# Patient Record
Sex: FEMALE | Race: BLACK OR AFRICAN AMERICAN | Employment: OTHER | URBAN - METROPOLITAN AREA
[De-identification: names, ages, dates, MRNs, and addresses within clinical notes are randomized per-mention and may not be internally consistent; named-entity substitution may affect disease eponyms.]

---

## 2017-05-12 ENCOUNTER — TELEPHONE (OUTPATIENT)
Dept: PHARMACY | Facility: CLINIC | Age: 82
End: 2017-05-12

## 2017-05-12 DIAGNOSIS — I10 RESISTANT HYPERTENSION: Primary | ICD-10-CM

## 2018-06-15 ENCOUNTER — APPOINTMENT (OUTPATIENT)
Dept: GENERAL RADIOLOGY | Age: 83
End: 2018-06-15
Payer: MEDICARE

## 2018-06-15 ENCOUNTER — HOSPITAL ENCOUNTER (EMERGENCY)
Age: 83
Discharge: HOME | End: 2018-06-15
Attending: EMERGENCY MEDICINE
Payer: MEDICARE

## 2018-06-15 VITALS
SYSTOLIC BLOOD PRESSURE: 142 MMHG | TEMPERATURE: 97.7 F | HEART RATE: 86 BPM | OXYGEN SATURATION: 99 % | BODY MASS INDEX: 27.25 KG/M2 | RESPIRATION RATE: 17 BRPM | WEIGHT: 174 LBS | DIASTOLIC BLOOD PRESSURE: 92 MMHG

## 2018-06-15 DIAGNOSIS — I10 ESSENTIAL HYPERTENSION: ICD-10-CM

## 2018-06-15 DIAGNOSIS — K59.00 CONSTIPATION, UNSPECIFIED CONSTIPATION TYPE: Primary | ICD-10-CM

## 2018-06-15 LAB
ALBUMIN SERPL-MCNC: 4.4 G/DL (ref 3.5–5.2)
ALP BLD-CCNC: 102 U/L (ref 35–104)
ALT SERPL-CCNC: 18 U/L (ref 0–32)
ANION GAP SERPL CALCULATED.3IONS-SCNC: 13 MMOL/L (ref 7–16)
AST SERPL-CCNC: 22 U/L (ref 0–31)
BASOPHILS ABSOLUTE: 0.04 E9/L (ref 0–0.2)
BASOPHILS RELATIVE PERCENT: 0.7 % (ref 0–2)
BILIRUB SERPL-MCNC: 0.5 MG/DL (ref 0–1.2)
BUN BLDV-MCNC: 13 MG/DL (ref 8–23)
CALCIUM SERPL-MCNC: 9.7 MG/DL (ref 8.6–10.2)
CHLORIDE BLD-SCNC: 103 MMOL/L (ref 98–107)
CO2: 25 MMOL/L (ref 22–29)
CREAT SERPL-MCNC: 1.2 MG/DL (ref 0.5–1)
EOSINOPHILS ABSOLUTE: 0.15 E9/L (ref 0.05–0.5)
EOSINOPHILS RELATIVE PERCENT: 2.8 % (ref 0–6)
GFR AFRICAN AMERICAN: 52
GFR NON-AFRICAN AMERICAN: 52 ML/MIN/1.73
GLUCOSE BLD-MCNC: 159 MG/DL (ref 74–109)
HCT VFR BLD CALC: 40.8 % (ref 34–48)
HEMOGLOBIN: 13.9 G/DL (ref 11.5–15.5)
IMMATURE GRANULOCYTES #: 0.01 E9/L
IMMATURE GRANULOCYTES %: 0.2 % (ref 0–5)
LACTIC ACID, SEPSIS: 1.1 MMOL/L (ref 0.5–1.9)
LYMPHOCYTES ABSOLUTE: 2.56 E9/L (ref 1.5–4)
LYMPHOCYTES RELATIVE PERCENT: 47.5 % (ref 20–42)
MCH RBC QN AUTO: 32.4 PG (ref 26–35)
MCHC RBC AUTO-ENTMCNC: 34.1 % (ref 32–34.5)
MCV RBC AUTO: 95.1 FL (ref 80–99.9)
MONOCYTES ABSOLUTE: 0.59 E9/L (ref 0.1–0.95)
MONOCYTES RELATIVE PERCENT: 10.9 % (ref 2–12)
NEUTROPHILS ABSOLUTE: 2.04 E9/L (ref 1.8–7.3)
NEUTROPHILS RELATIVE PERCENT: 37.9 % (ref 43–80)
PDW BLD-RTO: 13.8 FL (ref 11.5–15)
PLATELET # BLD: 345 E9/L (ref 130–450)
PMV BLD AUTO: 8.8 FL (ref 7–12)
POTASSIUM SERPL-SCNC: 3.8 MMOL/L (ref 3.5–5)
RBC # BLD: 4.29 E12/L (ref 3.5–5.5)
SODIUM BLD-SCNC: 141 MMOL/L (ref 132–146)
TOTAL PROTEIN: 7.4 G/DL (ref 6.4–8.3)
WBC # BLD: 5.4 E9/L (ref 4.5–11.5)

## 2018-06-15 PROCEDURE — 74022 RADEX COMPL AQT ABD SERIES: CPT

## 2018-06-15 PROCEDURE — 80053 COMPREHEN METABOLIC PANEL: CPT

## 2018-06-15 PROCEDURE — 99284 EMERGENCY DEPT VISIT MOD MDM: CPT

## 2018-06-15 PROCEDURE — 83605 ASSAY OF LACTIC ACID: CPT

## 2018-06-15 PROCEDURE — 36415 COLL VENOUS BLD VENIPUNCTURE: CPT

## 2018-06-15 PROCEDURE — 85025 COMPLETE CBC W/AUTO DIFF WBC: CPT

## 2018-06-15 RX ORDER — POLYETHYLENE GLYCOL 3350 17 G/17G
POWDER, FOR SOLUTION ORAL
Qty: 1 BOTTLE | Refills: 0 | Status: SHIPPED | OUTPATIENT
Start: 2018-06-15 | End: 2019-01-09 | Stop reason: SDUPTHER

## 2018-06-15 RX ORDER — MAGNESIUM CARB/ALUMINUM HYDROX 105-160MG
TABLET,CHEWABLE ORAL
Qty: 1 BOTTLE | Refills: 0 | Status: SHIPPED | OUTPATIENT
Start: 2018-06-15 | End: 2018-07-23 | Stop reason: ALTCHOICE

## 2018-06-15 ASSESSMENT — ENCOUNTER SYMPTOMS
ABDOMINAL DISTENTION: 0
BLOOD IN STOOL: 0
SHORTNESS OF BREATH: 0
ABDOMINAL PAIN: 0
VOMITING: 0
COUGH: 0
NAUSEA: 0
COLOR CHANGE: 0
DIARRHEA: 0
CONSTIPATION: 1
SORE THROAT: 0
BACK PAIN: 0

## 2018-07-23 ENCOUNTER — HOSPITAL ENCOUNTER (INPATIENT)
Age: 83
LOS: 4 days | Discharge: HOME | DRG: 683 | End: 2018-07-27
Attending: INTERNAL MEDICINE | Admitting: INTERNAL MEDICINE
Payer: MEDICARE

## 2018-07-23 PROBLEM — R55 SYNCOPE: Status: ACTIVE | Noted: 2018-07-23

## 2018-07-23 PROCEDURE — 2060000000 HC ICU INTERMEDIATE R&B

## 2018-07-23 ASSESSMENT — PAIN SCALES - GENERAL: PAINLEVEL_OUTOF10: 0

## 2018-07-24 LAB
ALBUMIN SERPL-MCNC: 3.8 G/DL (ref 3.5–5.2)
ALP BLD-CCNC: 78 U/L (ref 35–104)
ALT SERPL-CCNC: 21 U/L (ref 0–32)
ANION GAP SERPL CALCULATED.3IONS-SCNC: 13 MMOL/L (ref 7–16)
AST SERPL-CCNC: 26 U/L (ref 0–31)
BASOPHILS ABSOLUTE: 0.04 E9/L (ref 0–0.2)
BASOPHILS RELATIVE PERCENT: 0.6 % (ref 0–2)
BILIRUB SERPL-MCNC: 0.6 MG/DL (ref 0–1.2)
BUN BLDV-MCNC: 30 MG/DL (ref 8–23)
CALCIUM SERPL-MCNC: 9.6 MG/DL (ref 8.6–10.2)
CHLORIDE BLD-SCNC: 104 MMOL/L (ref 98–107)
CHLORIDE URINE RANDOM: 21 MMOL/L
CO2: 24 MMOL/L (ref 22–29)
CREAT SERPL-MCNC: 1.3 MG/DL (ref 0.5–1)
CREATININE URINE: 16 MG/DL (ref 29–226)
EOSINOPHILS ABSOLUTE: 0.15 E9/L (ref 0.05–0.5)
EOSINOPHILS RELATIVE PERCENT: 2.4 % (ref 0–6)
GFR AFRICAN AMERICAN: 47
GFR NON-AFRICAN AMERICAN: 47 ML/MIN/1.73
GLUCOSE BLD-MCNC: 122 MG/DL (ref 74–109)
HCT VFR BLD CALC: 39.1 % (ref 34–48)
HEMOGLOBIN: 13 G/DL (ref 11.5–15.5)
IMMATURE GRANULOCYTES #: 0.01 E9/L
IMMATURE GRANULOCYTES %: 0.2 % (ref 0–5)
LYMPHOCYTES ABSOLUTE: 3.45 E9/L (ref 1.5–4)
LYMPHOCYTES RELATIVE PERCENT: 55.2 % (ref 20–42)
MAGNESIUM: 2.2 MG/DL (ref 1.6–2.6)
MCH RBC QN AUTO: 31.9 PG (ref 26–35)
MCHC RBC AUTO-ENTMCNC: 33.2 % (ref 32–34.5)
MCV RBC AUTO: 95.8 FL (ref 80–99.9)
MONOCYTES ABSOLUTE: 0.72 E9/L (ref 0.1–0.95)
MONOCYTES RELATIVE PERCENT: 11.5 % (ref 2–12)
NEUTROPHILS ABSOLUTE: 1.88 E9/L (ref 1.8–7.3)
NEUTROPHILS RELATIVE PERCENT: 30.1 % (ref 43–80)
OSMOLALITY URINE: 141 MOSM/KG (ref 300–900)
PDW BLD-RTO: 13.6 FL (ref 11.5–15)
PHOSPHORUS: 4 MG/DL (ref 2.5–4.5)
PLATELET # BLD: 225 E9/L (ref 130–450)
PMV BLD AUTO: 8.9 FL (ref 7–12)
POTASSIUM SERPL-SCNC: 4.2 MMOL/L (ref 3.5–5)
PROTEIN PROTEIN: <4 MG/DL (ref 0–12)
PROTEIN/CREAT RATIO: 0.2
PROTEIN/CREAT RATIO: 0.2 (ref 0–0.2)
RBC # BLD: 4.08 E12/L (ref 3.5–5.5)
SODIUM BLD-SCNC: 141 MMOL/L (ref 132–146)
SODIUM URINE: 29 MMOL/L
TOTAL PROTEIN: 6.2 G/DL (ref 6.4–8.3)
TSH SERPL DL<=0.05 MIU/L-ACNC: 4.51 UIU/ML (ref 0.27–4.2)
WBC # BLD: 6.3 E9/L (ref 4.5–11.5)

## 2018-07-24 PROCEDURE — 2580000003 HC RX 258: Performed by: INTERNAL MEDICINE

## 2018-07-24 PROCEDURE — 84300 ASSAY OF URINE SODIUM: CPT

## 2018-07-24 PROCEDURE — 6370000000 HC RX 637 (ALT 250 FOR IP): Performed by: INTERNAL MEDICINE

## 2018-07-24 PROCEDURE — 84443 ASSAY THYROID STIM HORMONE: CPT

## 2018-07-24 PROCEDURE — 83935 ASSAY OF URINE OSMOLALITY: CPT

## 2018-07-24 PROCEDURE — 82570 ASSAY OF URINE CREATININE: CPT

## 2018-07-24 PROCEDURE — 36415 COLL VENOUS BLD VENIPUNCTURE: CPT

## 2018-07-24 PROCEDURE — 84156 ASSAY OF PROTEIN URINE: CPT

## 2018-07-24 PROCEDURE — 2060000000 HC ICU INTERMEDIATE R&B

## 2018-07-24 PROCEDURE — 84100 ASSAY OF PHOSPHORUS: CPT

## 2018-07-24 PROCEDURE — 85025 COMPLETE CBC W/AUTO DIFF WBC: CPT

## 2018-07-24 PROCEDURE — 80053 COMPREHEN METABOLIC PANEL: CPT

## 2018-07-24 PROCEDURE — 83735 ASSAY OF MAGNESIUM: CPT

## 2018-07-24 PROCEDURE — 82436 ASSAY OF URINE CHLORIDE: CPT

## 2018-07-24 PROCEDURE — 2500000003 HC RX 250 WO HCPCS: Performed by: INTERNAL MEDICINE

## 2018-07-24 RX ORDER — CLONIDINE HYDROCHLORIDE 0.1 MG/1
0.1 TABLET ORAL EVERY 4 HOURS PRN
Status: DISCONTINUED | OUTPATIENT
Start: 2018-07-23 | End: 2018-07-24

## 2018-07-24 RX ORDER — CARVEDILOL 6.25 MG/1
12.5 TABLET ORAL 2 TIMES DAILY WITH MEALS
Status: DISCONTINUED | OUTPATIENT
Start: 2018-07-24 | End: 2018-07-25

## 2018-07-24 RX ORDER — SODIUM CHLORIDE 0.9 % (FLUSH) 0.9 %
10 SYRINGE (ML) INJECTION PRN
Status: DISCONTINUED | OUTPATIENT
Start: 2018-07-24 | End: 2018-07-27 | Stop reason: HOSPADM

## 2018-07-24 RX ORDER — LOSARTAN POTASSIUM 50 MG/1
100 TABLET ORAL DAILY
Status: DISCONTINUED | OUTPATIENT
Start: 2018-07-24 | End: 2018-07-27 | Stop reason: HOSPADM

## 2018-07-24 RX ORDER — ACETAMINOPHEN 325 MG/1
650 TABLET ORAL EVERY 4 HOURS PRN
Status: DISCONTINUED | OUTPATIENT
Start: 2018-07-23 | End: 2018-07-27 | Stop reason: HOSPADM

## 2018-07-24 RX ORDER — LABETALOL HYDROCHLORIDE 5 MG/ML
10 INJECTION, SOLUTION INTRAVENOUS EVERY 4 HOURS PRN
Status: DISCONTINUED | OUTPATIENT
Start: 2018-07-24 | End: 2018-07-27 | Stop reason: HOSPADM

## 2018-07-24 RX ORDER — SODIUM CHLORIDE 0.9 % (FLUSH) 0.9 %
10 SYRINGE (ML) INJECTION EVERY 12 HOURS
Status: DISCONTINUED | OUTPATIENT
Start: 2018-07-24 | End: 2018-07-27 | Stop reason: HOSPADM

## 2018-07-24 RX ORDER — HYDRALAZINE HYDROCHLORIDE 20 MG/ML
10 INJECTION INTRAMUSCULAR; INTRAVENOUS EVERY 4 HOURS PRN
Status: DISCONTINUED | OUTPATIENT
Start: 2018-07-24 | End: 2018-07-25

## 2018-07-24 RX ORDER — HEPARIN SODIUM 10000 [USP'U]/ML
5000 INJECTION, SOLUTION INTRAVENOUS; SUBCUTANEOUS 3 TIMES DAILY
Status: DISCONTINUED | OUTPATIENT
Start: 2018-07-24 | End: 2018-07-27 | Stop reason: HOSPADM

## 2018-07-24 RX ORDER — CLONIDINE HYDROCHLORIDE 0.1 MG/1
0.1 TABLET ORAL 2 TIMES DAILY
Status: DISCONTINUED | OUTPATIENT
Start: 2018-07-24 | End: 2018-07-24

## 2018-07-24 RX ADMIN — Medication 10 ML: at 20:40

## 2018-07-24 RX ADMIN — CLONIDINE HYDROCHLORIDE 0.1 MG: 0.1 TABLET ORAL at 20:37

## 2018-07-24 RX ADMIN — Medication 10 ML: at 23:34

## 2018-07-24 RX ADMIN — CLONIDINE HYDROCHLORIDE 0.1 MG: 0.1 TABLET ORAL at 09:44

## 2018-07-24 RX ADMIN — LABETALOL HYDROCHLORIDE 10 MG: 5 INJECTION INTRAVENOUS at 23:33

## 2018-07-24 RX ADMIN — CLONIDINE HYDROCHLORIDE 0.1 MG: 0.1 TABLET ORAL at 00:30

## 2018-07-24 RX ADMIN — CARVEDILOL 12.5 MG: 6.25 TABLET, FILM COATED ORAL at 17:54

## 2018-07-24 RX ADMIN — LOSARTAN POTASSIUM 100 MG: 50 TABLET, FILM COATED ORAL at 09:43

## 2018-07-24 RX ADMIN — Medication 10 ML: at 09:44

## 2018-07-24 RX ADMIN — CARVEDILOL 12.5 MG: 6.25 TABLET, FILM COATED ORAL at 09:44

## 2018-07-24 ASSESSMENT — PAIN SCALES - GENERAL
PAINLEVEL_OUTOF10: 0

## 2018-07-24 NOTE — PLAN OF CARE
Problem: Cardiac:  Goal: Ability to maintain vital signs within normal range will improve  Ability to maintain vital signs within normal range will improve   Outcome: Met This Shift      Problem: SAFETY  Goal: Free from accidental physical injury  Outcome: Met This Shift      Problem: PAIN  Goal: Patient's pain/discomfort is manageable  Outcome: Met This Shift      Problem: SKIN INTEGRITY  Goal: Skin integrity is maintained or improved  Outcome: Met This Shift

## 2018-07-24 NOTE — H&P
Department of Internal Medicine  Dr. Claire Hardwick History and Physical      CHIEF COMPLAINT:  Passing out  Reason for Admission:  Syncope. Labile blood pressures    HISTORY OF PRESENT ILLNESS:      The patient is a 80 y.o. female with  who presents with the above problems. I believe she has slight dementia and she has an anxiety problem related to her blood pressure. She checks it multiple times a day. We got her off of hydralazine and she's been feeling great but she says she has episodes of blood pressures in the 180s and then blood pressures in the low 603Q systolically. She's been passing out briefly because of the blood pressures. 649 or less systolically    Past Medical History:        Diagnosis Date    Hypertension     pt states controlled    Left cataract     Type 2 diabetes mellitus with renal manifestations ([facility] [address] 12/31/2015     Past Surgical History:        Procedure Laterality Date    CATARACT REMOVAL WITH IMPLANT Right     CHOLECYSTECTOMY      COLONOSCOPY      HYSTERECTOMY         Medications Prior to Admission:    Prescriptions Prior to Admission: irbesartan (AVAPRO) 300 MG tablet, Take 1 tablet by mouth nightly  cloNIDine (CATAPRES) 0.2 MG tablet, 0.2 mg bid unless systolic over 789 then 1 q 4  carvedilol (COREG) 25 MG tablet, Take 1 tablet by mouth 2 times daily  polyethylene glycol (MIRALAX) powder, Take 17 g daily for the next 10 days. Decreased to one half capful once daily once having soft stools. aspirin 325 MG tablet, Take 325 mg by mouth daily     Allergies: Barbiturates; Phenobarbital; and Valium [diazepam]    Social History:   TOBACCO:   reports that she has quit smoking.  She has never used smokeless tobacco.    Family History:   Family History   Problem Relation Age of Onset    High Blood Pressure Sister     Diabetes Sister     Coronary Art Dis Sister     Diabetes Brother     High Blood Pressure Brother     Diabetes Brother     High Blood Pressure Brother     Stroke Brother 59

## 2018-07-24 NOTE — CONSULTS
She denies nausea vomiting chest pain or palpitations cough wheeze or sputum production bowel pain or cramping dysuria hematuria. She denies peripheral swelling the skin rash or lesions. She denies taking NSAIDs. She is not on a recent course of antimicrobials. Past Medical History:   Diagnosis Date    Hypertension     pt states controlled    Left cataract     Type 2 diabetes mellitus with renal manifestations ([facility] [address] 12/31/2015       Past Surgical History:   Procedure Laterality Date    CATARACT REMOVAL WITH IMPLANT Right     CHOLECYSTECTOMY      COLONOSCOPY      HYSTERECTOMY         Family History   Problem Relation Age of Onset    High Blood Pressure Sister     Diabetes Sister     Coronary Art Dis Sister     Diabetes Brother     High Blood Pressure Brother     Diabetes Brother     High Blood Pressure Brother     Stroke Brother 40    High Blood Pressure Brother     Kidney Disease Brother     Diabetes Brother     Diabetes Brother     Coronary Art Dis Brother     Kidney Disease Brother     High Blood Pressure Brother     Diabetes Brother         reports that she has quit smoking. She has never used smokeless tobacco. She reports that she drinks alcohol. She reports that she does not use drugs. Allergies: Barbiturates; Phenobarbital; and Valium [diazepam]    Current Medications:      acetaminophen (TYLENOL) tablet 650 mg Q4H PRN   heparin (porcine) injection 5,000 Units TID   cloNIDine (CATAPRES) tablet 0.1 mg BID   cloNIDine (CATAPRES) tablet 0.1 mg Q4H PRN   losartan (COZAAR) tablet 100 mg Daily   carvedilol (COREG) tablet 12.5 mg BID WC   sodium chloride flush 0.9 % injection 10 mL Q12H   sodium chloride flush 0.9 % injection 10 mL PRN       Review of Systems:   A comprehensive review of systems was negative.     Physical exam:   BP (!) 145/68   Pulse 78   Temp 98.8 °F (37.1 °C) (Temporal)   Resp 14   Ht 5' 7\" (1.702 m)   Wt 169 lb 8 oz (76.9 kg)   LMP  (LMP Unknown)   SpO2

## 2018-07-24 NOTE — PROGRESS NOTES
Perfect serv message to Dr. Laurie Jasso re: Admission order for pt that is a direct admit.  Informed Dr. Laurie Jasso that home med rec completed and gave admit VS.

## 2018-07-25 LAB
ANION GAP SERPL CALCULATED.3IONS-SCNC: 13 MMOL/L (ref 7–16)
BUN BLDV-MCNC: 25 MG/DL (ref 8–23)
CALCIUM SERPL-MCNC: 9.5 MG/DL (ref 8.6–10.2)
CHLORIDE BLD-SCNC: 106 MMOL/L (ref 98–107)
CO2: 24 MMOL/L (ref 22–29)
CREAT SERPL-MCNC: 1.2 MG/DL (ref 0.5–1)
GFR AFRICAN AMERICAN: 52
GFR NON-AFRICAN AMERICAN: 52 ML/MIN/1.73
GLUCOSE BLD-MCNC: 126 MG/DL (ref 74–109)
POTASSIUM SERPL-SCNC: 4.1 MMOL/L (ref 3.5–5)
SODIUM BLD-SCNC: 143 MMOL/L (ref 132–146)

## 2018-07-25 PROCEDURE — 97161 PT EVAL LOW COMPLEX 20 MIN: CPT

## 2018-07-25 PROCEDURE — 36415 COLL VENOUS BLD VENIPUNCTURE: CPT

## 2018-07-25 PROCEDURE — 6370000000 HC RX 637 (ALT 250 FOR IP): Performed by: INTERNAL MEDICINE

## 2018-07-25 PROCEDURE — G8979 MOBILITY GOAL STATUS: HCPCS

## 2018-07-25 PROCEDURE — G8978 MOBILITY CURRENT STATUS: HCPCS

## 2018-07-25 PROCEDURE — 2580000003 HC RX 258: Performed by: INTERNAL MEDICINE

## 2018-07-25 PROCEDURE — 80048 BASIC METABOLIC PNL TOTAL CA: CPT

## 2018-07-25 PROCEDURE — 2500000003 HC RX 250 WO HCPCS: Performed by: INTERNAL MEDICINE

## 2018-07-25 PROCEDURE — 2060000000 HC ICU INTERMEDIATE R&B

## 2018-07-25 RX ORDER — CARVEDILOL 25 MG/1
25 TABLET ORAL 2 TIMES DAILY WITH MEALS
Status: DISCONTINUED | OUTPATIENT
Start: 2018-07-25 | End: 2018-07-25

## 2018-07-25 RX ORDER — CARVEDILOL 6.25 MG/1
12.5 TABLET ORAL 2 TIMES DAILY WITH MEALS
Status: DISCONTINUED | OUTPATIENT
Start: 2018-07-25 | End: 2018-07-27 | Stop reason: HOSPADM

## 2018-07-25 RX ORDER — NIFEDIPINE 30 MG/1
30 TABLET, EXTENDED RELEASE ORAL DAILY
Status: DISCONTINUED | OUTPATIENT
Start: 2018-07-25 | End: 2018-07-27 | Stop reason: HOSPADM

## 2018-07-25 RX ORDER — AMMONIUM LACTATE 12 G/100G
LOTION TOPICAL 2 TIMES DAILY PRN
Status: DISCONTINUED | OUTPATIENT
Start: 2018-07-25 | End: 2018-07-27 | Stop reason: HOSPADM

## 2018-07-25 RX ADMIN — LABETALOL HYDROCHLORIDE 10 MG: 5 INJECTION INTRAVENOUS at 20:57

## 2018-07-25 RX ADMIN — Medication 10 ML: at 20:57

## 2018-07-25 RX ADMIN — NIFEDIPINE 30 MG: 30 TABLET, FILM COATED, EXTENDED RELEASE ORAL at 13:17

## 2018-07-25 RX ADMIN — LABETALOL HYDROCHLORIDE 10 MG: 5 INJECTION INTRAVENOUS at 15:47

## 2018-07-25 RX ADMIN — ACETAMINOPHEN 650 MG: 325 TABLET, FILM COATED ORAL at 16:55

## 2018-07-25 RX ADMIN — Medication 10 ML: at 08:13

## 2018-07-25 RX ADMIN — CARVEDILOL 12.5 MG: 6.25 TABLET, FILM COATED ORAL at 18:14

## 2018-07-25 RX ADMIN — CARVEDILOL 25 MG: 6.25 TABLET, FILM COATED ORAL at 09:02

## 2018-07-25 RX ADMIN — LOSARTAN POTASSIUM 100 MG: 50 TABLET, FILM COATED ORAL at 08:12

## 2018-07-25 ASSESSMENT — PAIN SCALES - GENERAL
PAINLEVEL_OUTOF10: 0
PAINLEVEL_OUTOF10: 5
PAINLEVEL_OUTOF10: 0

## 2018-07-25 NOTE — PROGRESS NOTES
injection 10 mL, 10 mL, Intravenous, PRN, Lashawn Perez MD, 10 mL at 07/24/18 2334    labetalol (NORMODYNE;TRANDATE) injection 10 mg, 10 mg, Intravenous, Q4H PRN, Pino Godinez MD, 10 mg at 07/24/18 2333    hydrALAZINE (APRESOLINE) injection 10 mg, 10 mg, Intravenous, Q4H PRN, Pino Godinez MD    A/P:      Patient Active Problem List   Diagnosis    Hyponatremia    CKD (chronic kidney disease)    Resistant hypertension    Type 2 diabetes mellitus with renal manifestations (HCC)    Swelling    Pyelonephritis    Hypothyroid    Post-menopausal    Osteoarthritis    Sinusitis    MCI (mild cognitive impairment)    Bilateral carpal tunnel syndrome    Lumbosacral radiculopathy at S1    Syncope        PLAN:long discussion with patient today on her nephrology consult in my discussion with him. She'll be off the clonidine. We'll increase the Coreg to 25 mg twice a day. We may add the chlorthalidone.

## 2018-07-25 NOTE — CARE COORDINATION
7/25/2018 social work transition of care/dicharge planning  Patient is from home alone and had been independent. Patient did not report the use of any assistive devices,no home o2,no hx of snf/hhc. Patient PCP is Dr. Saira Mosqueda and pharmacy is [facility] on [address]. Explained sw role in transition of care/discharge planning. Patient plan is to return home with no needs,has own transportation. Sw will follow and assist prn.   Electronically signed by ALYSSA Hood on 7/25/2018 at 12:59 PM

## 2018-07-25 NOTE — PLAN OF CARE
Problem: Physical Regulation:  Goal: Complications related to the disease process, condition or treatment will be avoided or minimized  Complications related to the disease process, condition or treatment will be avoided or minimized   Outcome: Met This Shift      Problem: SAFETY  Goal: Free from accidental physical injury  Outcome: Met This Shift      Problem: PAIN  Goal: Patient's pain/discomfort is manageable  Outcome: Met This Shift

## 2018-07-25 NOTE — PROGRESS NOTES
will be discontinued as patient independent. Thank you for the opportunity to assist in the care of this patient.     Time in: 1033  Time out: Mateo Perkins PT, DPT   License number:  [id number]

## 2018-07-26 PROCEDURE — 6360000002 HC RX W HCPCS: Performed by: INTERNAL MEDICINE

## 2018-07-26 PROCEDURE — 6370000000 HC RX 637 (ALT 250 FOR IP): Performed by: INTERNAL MEDICINE

## 2018-07-26 PROCEDURE — 2580000003 HC RX 258: Performed by: INTERNAL MEDICINE

## 2018-07-26 PROCEDURE — 2060000000 HC ICU INTERMEDIATE R&B

## 2018-07-26 RX ORDER — CHLORTHALIDONE 25 MG/1
12.5 TABLET ORAL DAILY
Status: DISCONTINUED | OUTPATIENT
Start: 2018-07-26 | End: 2018-07-27 | Stop reason: HOSPADM

## 2018-07-26 RX ADMIN — HEPARIN SODIUM 5000 UNITS: 10000 INJECTION INTRAVENOUS; SUBCUTANEOUS at 20:51

## 2018-07-26 RX ADMIN — CARVEDILOL 12.5 MG: 6.25 TABLET, FILM COATED ORAL at 17:00

## 2018-07-26 RX ADMIN — CARVEDILOL 12.5 MG: 6.25 TABLET, FILM COATED ORAL at 08:08

## 2018-07-26 RX ADMIN — LOSARTAN POTASSIUM 100 MG: 50 TABLET, FILM COATED ORAL at 08:08

## 2018-07-26 RX ADMIN — Medication 10 ML: at 20:51

## 2018-07-26 RX ADMIN — Medication 10 ML: at 08:09

## 2018-07-26 RX ADMIN — CHLORTHALIDONE 12.5 MG: 25 TABLET ORAL at 14:12

## 2018-07-26 RX ADMIN — NIFEDIPINE 30 MG: 30 TABLET, FILM COATED, EXTENDED RELEASE ORAL at 08:08

## 2018-07-26 RX ADMIN — ACETAMINOPHEN 650 MG: 325 TABLET, FILM COATED ORAL at 19:47

## 2018-07-26 ASSESSMENT — PAIN SCALES - GENERAL
PAINLEVEL_OUTOF10: 4
PAINLEVEL_OUTOF10: 0

## 2018-07-26 ASSESSMENT — PAIN DESCRIPTION - PAIN TYPE: TYPE: ACUTE PAIN;CHRONIC PAIN

## 2018-07-26 NOTE — PROGRESS NOTES
diuretic recently. Irbesartan added by Dr. Leo Leal last week, though she has yet to  that prescription. She does tell me that she has been on Diovan in the past and per her own recollection it was effective and well tolerated.     Already BP last evening, received labetalol. Did not receive hydralazine.       Recommendations  1. Continue losartan 100 mg daily  2. Continue carvedilol at 12.5 mg b.i.d. (she's been intolerant of the higher dose -- reduced to 12.5 again)  3. Continue to hold the clonidine altogether  4. Start low-dose calcium channel blocker (not amlodipine, try nifedipine)   5. Though I hesitate to start a thiazide in an 80year-old woman, we might try low-dose chlorthalidone in conjunction with the losartan and a somewhat lower dose of carvedilol -- not today  6. No further evaluation this time -- inpatient and outpatient evaluations to proven unrevealing in the past      I will go a head and add chlorthalidone 12.5 mg . This way she is on coreg (12.5 mg bid ) , losartan 100 mg and procardia xl 30 mg . No hydralzine (pt feels she is allergic to)   No clonidine (it is likely contributing to bp spike and to her feeling tired )   Will need bmp in 2 weeks .      Electronically signed by Linnette Zhu MD on 7/26/2018 at 12:39 PM

## 2018-07-27 VITALS
DIASTOLIC BLOOD PRESSURE: 73 MMHG | HEIGHT: 67 IN | SYSTOLIC BLOOD PRESSURE: 130 MMHG | RESPIRATION RATE: 16 BRPM | BODY MASS INDEX: 25.83 KG/M2 | HEART RATE: 80 BPM | TEMPERATURE: 99 F | OXYGEN SATURATION: 99 % | WEIGHT: 164.6 LBS

## 2018-07-27 PROCEDURE — 6370000000 HC RX 637 (ALT 250 FOR IP): Performed by: INTERNAL MEDICINE

## 2018-07-27 PROCEDURE — 6360000002 HC RX W HCPCS: Performed by: INTERNAL MEDICINE

## 2018-07-27 PROCEDURE — 2580000003 HC RX 258: Performed by: INTERNAL MEDICINE

## 2018-07-27 RX ORDER — CHLORTHALIDONE 25 MG/1
12.5 TABLET ORAL DAILY
Qty: 30 TABLET | Refills: 3 | Status: SHIPPED | OUTPATIENT
Start: 2018-07-28 | End: 2019-01-09 | Stop reason: SDUPTHER

## 2018-07-27 RX ORDER — NIFEDIPINE 30 MG/1
30 TABLET, FILM COATED, EXTENDED RELEASE ORAL DAILY
Qty: 30 TABLET | Refills: 3 | Status: SHIPPED | OUTPATIENT
Start: 2018-07-28 | End: 2018-09-10 | Stop reason: DRUGHIGH

## 2018-07-27 RX ORDER — CARVEDILOL 12.5 MG/1
12.5 TABLET ORAL 2 TIMES DAILY WITH MEALS
Qty: 60 TABLET | Refills: 3 | Status: SHIPPED | OUTPATIENT
Start: 2018-07-27 | End: 2018-11-19 | Stop reason: SDUPTHER

## 2018-07-27 RX ORDER — AMMONIUM LACTATE 12 G/100G
LOTION TOPICAL
Qty: 1 BOTTLE | Refills: 0 | Status: SHIPPED | OUTPATIENT
Start: 2018-07-27 | End: 2018-10-15 | Stop reason: ALTCHOICE

## 2018-07-27 RX ADMIN — Medication 10 ML: at 09:20

## 2018-07-27 RX ADMIN — CARVEDILOL 12.5 MG: 6.25 TABLET, FILM COATED ORAL at 09:19

## 2018-07-27 RX ADMIN — LOSARTAN POTASSIUM 100 MG: 50 TABLET, FILM COATED ORAL at 09:19

## 2018-07-27 RX ADMIN — CHLORTHALIDONE 12.5 MG: 25 TABLET ORAL at 09:19

## 2018-07-27 RX ADMIN — HEPARIN SODIUM 5000 UNITS: 10000 INJECTION INTRAVENOUS; SUBCUTANEOUS at 09:20

## 2018-07-27 RX ADMIN — NIFEDIPINE 30 MG: 30 TABLET, FILM COATED, EXTENDED RELEASE ORAL at 09:19

## 2018-07-27 NOTE — PROGRESS NOTES
[facility] MD Darion Avila MD Loria Glory, MD Valorie Gin, MD  Progress Note    7/27/2018    SUBJECTIVE:   7/25: Pruritus. She thinks it might be the hydralazine and labetalol for the losartan. Otherwise agitated, mostly yelling , answers vaguely to most questions but seems to deny all other complaints. Recounts again all the medication she's been on in the past and recites the side effects has been, though notably, all of these complaints are different than the one she mentioned yesterday. She is upset that the carvedilol dose was increased to 25 b.i.d.    7/26 : seen today , emotional . BP in 140s . Talked to family member and they are telling me she checks her bp so often at home even when she goes out/to a restaurant / she will take her BP cuff and check her BP .     7/27 seen today appear comfortable , no complaints . Active Problems:    Syncope  Resolved Problems:    * No resolved hospital problems.  *         DIET:    DIET CARDIAC;     MEDS (scheduled):    chlorthalidone  12.5 mg Oral Daily    carvedilol  12.5 mg Oral BID WC    NIFEdipine  30 mg Oral Daily    heparin (porcine)  5,000 Units Subcutaneous TID    losartan  100 mg Oral Daily    sodium chloride flush  10 mL Intravenous Q12H       MEDS (infusions):      MEDS (prn):  ammonium lactate, acetaminophen, sodium chloride flush, labetalol    PHYSICAL EXAM:     Patient Vitals for the past 24 hrs:   BP Temp Temp src Pulse Resp SpO2 Weight   07/27/18 0915 130/73 99 °F (37.2 °C) Temporal 80 16 99 % -   07/27/18 0642 - - - - - - 164 lb 9.6 oz (74.7 kg)   07/26/18 2345 (!) 158/82 97.5 °F (36.4 °C) Axillary 83 18 100 % -   07/26/18 2045 (!) 144/86 - - - - - -   07/26/18 1533 (!) 152/78 98.4 °F (36.9 °C) Temporal 70 18 - -   07/26/18 1245 (!) 140/82 - - - - - -   @      Intake/Output Summary (Last 24 hours) at 07/27/18 1048  Last data filed at 07/27/18 0904   Gross per 24 hour   Intake intervals, and may be experiencing some rebound hypertension. Same story with carvedilol. She's currently using the medications prescribed to her inappropriately, taking clonidine multiple times yesterday, and oftentimes not taking it at all for many hours beyond when she is due a dose. Given what is described above, one wonders as regards Munchhausen syndrome.     As a combination the carvedilol and clonidine causing fatigue, malaise, decreased exercise tolerance. She has been intolerant of amlodipine due to ankle swelling. Intolerant of hydralazine for multiple traumatic symptoms. Does not seem to try to diuretic recently. Irbesartan added by Dr. Gabriel Keys last week, though she has yet to  that prescription. She does tell me that she has been on Diovan in the past and per her own recollection it was effective and well tolerated.     Already BP last evening, received labetalol. Did not receive hydralazine.       Recommendations  1. Continue losartan 100 mg daily  2. Continue carvedilol at 12.5 mg b.i.d. (she's been intolerant of the higher dose -- reduced to 12.5 again)  3. Continue to hold the clonidine altogether  4. Start low-dose calcium channel blocker (not amlodipine, try nifedipine)   5. Though I hesitate to start a thiazide in an 80year-old woman, we might try low-dose chlorthalidone in conjunction with the losartan and a somewhat lower dose of carvedilol -- not today  6. No further evaluation this time -- inpatient and outpatient evaluations to proven unrevealing in the past        BP seems to be stablizing . Ok to d/c on the same regiemn she is on in hospital .      chlorthalidone 12.5 mg ,on coreg (12.5 mg bid ) , losartan 100 mg and procardia xl 30 mg . No hydralzine (pt feels she is allergic to)   No clonidine (it is likely contributing to bp spike and to her feeling tired )    bmp in 2 weeks .    Keep diary of BP numbers call office in 2 weeks     Electronically signed by Jessica Aguilar MD on 7/27/2018 at 10:48 AM

## 2018-07-27 NOTE — PLAN OF CARE
Problem: Cardiac:  Goal: Ability to maintain vital signs within normal range will improve  Ability to maintain vital signs within normal range will improve   Outcome: Met This Shift      Problem: SAFETY  Goal: Free from accidental physical injury  Outcome: Met This Shift      Problem: Falls - Risk of:  Goal: Will remain free from falls  Will remain free from falls   Outcome: Met This Shift

## 2018-07-27 NOTE — PLAN OF CARE
Problem: Cardiac:  Goal: Ability to maintain vital signs within normal range will improve  Ability to maintain vital signs within normal range will improve   Outcome: Met This Shift      Problem: Health Behavior:  Goal: Identification of resources available to assist in meeting health care needs will improve  Identification of resources available to assist in meeting health care needs will improve   Outcome: Met This Shift

## 2018-07-30 ENCOUNTER — CARE COORDINATION (OUTPATIENT)
Dept: CARE COORDINATION | Age: 83
End: 2018-07-30

## 2018-07-30 DIAGNOSIS — I10 RESISTANT HYPERTENSION: Primary | ICD-10-CM

## 2018-07-30 PROCEDURE — 1111F DSCHRG MED/CURRENT MED MERGE: CPT | Performed by: INTERNAL MEDICINE

## 2018-07-30 NOTE — CARE COORDINATION
[facility] Transitions Initial Follow Up Call    Call within 2 business days of discharge: Yes    Patient: Alex Gonzalez Patient : 1935   MRN: [id number]  Reason for Admission:  AVS Diagnosis:  Syncope  Discharge Date: 18 RARS: Readmission Risk Score: 14     Spoke with: Patient, Martha Beach: [facility] [address]    Non-face-to-face services provided:  Obtained and reviewed discharge summary and/or continuity of care documents; reconciled medications with the patient. Reviewed with patient that she will need a 7 day hospital follow up with PCP. Care Transitions 24 Hour Call    Do you have any ongoing symptoms?:  Yes    Patient-reported symptoms:  Fatigue, continues with C/O rash on neck that she had in the hospital, reports is unchanged,  and has some relief with prescribed lotion  Patient also reports that she \"wakes up in the morning sweating, soaking wet\". Patient reports right foot is sore by right great toe   and that she has been soaking it in epsom salt 2-3 times a day. Patient reports it's getting better. Patient reports yesterday she vomited after taking am medications. Patient reports she feels better today. Interventions for patient-reported symptoms:  (Comment: forward note to PCP)  Do you have a copy of your discharge instructions?:  Yes  Do you have all of your prescriptions and are they filled?:  Yes  Have you been contacted by a [id number] [facility] Pharmacist?:  No  Have you scheduled your follow up appointment?:  No  Were you discharged with any Home Care or Post Acute Services:  No  Do you feel like you have everything you need to keep you well at home?:  Yes  Care Transitions Interventions:  Notified Kit Suarez MA, at Dr. Rios Main office that patient is not taking prescribed Irbesartan; states she is not familiar with the medication. Kit Suarez to follow up with patient.     Notified Gael Dotson at Dr. Gabriel Sylvester office that patient will need to be called and scheduled for a hospital follow up/TCM visit within 7 days of discharge on 7/27/18. RN CTC provided emotional support to the patient. Follow Up  No future appointments.     Chaparrita Dwyer RN

## 2018-07-30 NOTE — TELEPHONE ENCOUNTER
Clarified medications with patient and she will resume taking the avapro at home. She does have this medication at home.

## 2018-07-30 NOTE — TELEPHONE ENCOUNTER
I will have my staff take care of this. She is to be on Avapro 300mg daily as I told her multiple times.  Thank you

## 2018-08-07 ENCOUNTER — CARE COORDINATION (OUTPATIENT)
Dept: CARE COORDINATION | Age: 83
End: 2018-08-07

## 2019-01-17 ENCOUNTER — HOSPITAL ENCOUNTER (OUTPATIENT)
Age: 84
Discharge: HOME | End: 2019-01-17
Payer: MEDICARE

## 2019-01-17 LAB
ANION GAP SERPL CALCULATED.3IONS-SCNC: 16 MMOL/L (ref 7–16)
BUN BLDV-MCNC: 21 MG/DL (ref 8–23)
CALCIUM SERPL-MCNC: 9.7 MG/DL (ref 8.6–10.2)
CHLORIDE BLD-SCNC: 97 MMOL/L (ref 98–107)
CO2: 27 MMOL/L (ref 22–29)
CREAT SERPL-MCNC: 1.3 MG/DL (ref 0.5–1)
CREATININE URINE: 138 MG/DL (ref 29–226)
GFR AFRICAN AMERICAN: 47
GFR NON-AFRICAN AMERICAN: 47 ML/MIN/1.73
GLUCOSE BLD-MCNC: 122 MG/DL (ref 74–99)
HCT VFR BLD CALC: 45.4 % (ref 34–48)
HEMOGLOBIN: 15.1 G/DL (ref 11.5–15.5)
MCH RBC QN AUTO: 32.6 PG (ref 26–35)
MCHC RBC AUTO-ENTMCNC: 33.3 % (ref 32–34.5)
MCV RBC AUTO: 98.1 FL (ref 80–99.9)
PARATHYROID HORMONE INTACT: 60 PG/ML (ref 15–65)
PDW BLD-RTO: 12.7 FL (ref 11.5–15)
PHOSPHORUS: 3.6 MG/DL (ref 2.5–4.5)
PLATELET # BLD: 284 E9/L (ref 130–450)
PMV BLD AUTO: 8.9 FL (ref 7–12)
POTASSIUM SERPL-SCNC: 4 MMOL/L (ref 3.5–5)
PROTEIN PROTEIN: 10 MG/DL (ref 0–12)
PROTEIN/CREAT RATIO: 0.1
PROTEIN/CREAT RATIO: 0.1 (ref 0–0.2)
RBC # BLD: 4.63 E12/L (ref 3.5–5.5)
SODIUM BLD-SCNC: 140 MMOL/L (ref 132–146)
VITAMIN D 25-HYDROXY: 49 NG/ML (ref 30–100)
WBC # BLD: 5.7 E9/L (ref 4.5–11.5)

## 2019-01-17 PROCEDURE — 84100 ASSAY OF PHOSPHORUS: CPT

## 2019-01-17 PROCEDURE — 80048 BASIC METABOLIC PNL TOTAL CA: CPT

## 2019-01-17 PROCEDURE — 82306 VITAMIN D 25 HYDROXY: CPT

## 2019-01-17 PROCEDURE — 84156 ASSAY OF PROTEIN URINE: CPT

## 2019-01-17 PROCEDURE — 85027 COMPLETE CBC AUTOMATED: CPT

## 2019-01-17 PROCEDURE — 83970 ASSAY OF PARATHORMONE: CPT

## 2019-01-17 PROCEDURE — 36415 COLL VENOUS BLD VENIPUNCTURE: CPT

## 2019-01-17 PROCEDURE — 82570 ASSAY OF URINE CREATININE: CPT

## 2019-09-10 ENCOUNTER — HOSPITAL ENCOUNTER (OUTPATIENT)
Age: 84
Discharge: HOME | End: 2019-09-10
Payer: MEDICARE

## 2019-09-10 LAB
ALBUMIN SERPL-MCNC: 4.4 G/DL (ref 3.5–5.2)
ALP BLD-CCNC: 90 U/L (ref 35–104)
ALT SERPL-CCNC: 25 U/L (ref 0–32)
ANION GAP SERPL CALCULATED.3IONS-SCNC: 14 MMOL/L (ref 7–16)
AST SERPL-CCNC: 32 U/L (ref 0–31)
BILIRUB SERPL-MCNC: 0.6 MG/DL (ref 0–1.2)
BUN BLDV-MCNC: 19 MG/DL (ref 8–23)
CALCIUM SERPL-MCNC: 9.8 MG/DL (ref 8.6–10.2)
CHLORIDE BLD-SCNC: 97 MMOL/L (ref 98–107)
CO2: 27 MMOL/L (ref 22–29)
CREAT SERPL-MCNC: 1.3 MG/DL (ref 0.5–1)
GFR AFRICAN AMERICAN: 47
GFR NON-AFRICAN AMERICAN: 47 ML/MIN/1.73
GLUCOSE BLD-MCNC: 130 MG/DL (ref 74–99)
HCT VFR BLD CALC: 46.4 % (ref 34–48)
HEMOGLOBIN: 15.1 G/DL (ref 11.5–15.5)
MCH RBC QN AUTO: 32.9 PG (ref 26–35)
MCHC RBC AUTO-ENTMCNC: 32.5 % (ref 32–34.5)
MCV RBC AUTO: 101.1 FL (ref 80–99.9)
PDW BLD-RTO: 13.2 FL (ref 11.5–15)
PLATELET # BLD: 305 E9/L (ref 130–450)
PMV BLD AUTO: 9.4 FL (ref 7–12)
POTASSIUM SERPL-SCNC: 3.4 MMOL/L (ref 3.5–5)
RBC # BLD: 4.59 E12/L (ref 3.5–5.5)
SODIUM BLD-SCNC: 138 MMOL/L (ref 132–146)
TOTAL PROTEIN: 7.7 G/DL (ref 6.4–8.3)
WBC # BLD: 5.9 E9/L (ref 4.5–11.5)

## 2019-09-10 PROCEDURE — 36415 COLL VENOUS BLD VENIPUNCTURE: CPT

## 2019-09-10 PROCEDURE — 85027 COMPLETE CBC AUTOMATED: CPT

## 2019-09-10 PROCEDURE — 80053 COMPREHEN METABOLIC PANEL: CPT

## 2019-11-14 ENCOUNTER — HOSPITAL ENCOUNTER (EMERGENCY)
Age: 84
Discharge: HOME | End: 2019-11-14
Payer: MEDICARE

## 2019-11-14 ENCOUNTER — APPOINTMENT (OUTPATIENT)
Dept: GENERAL RADIOLOGY | Age: 84
End: 2019-11-14
Payer: MEDICARE

## 2019-11-14 VITALS
OXYGEN SATURATION: 100 % | TEMPERATURE: 97.7 F | DIASTOLIC BLOOD PRESSURE: 81 MMHG | HEART RATE: 76 BPM | RESPIRATION RATE: 15 BRPM | SYSTOLIC BLOOD PRESSURE: 157 MMHG | HEIGHT: 67 IN | BODY MASS INDEX: 28.56 KG/M2 | WEIGHT: 182 LBS

## 2019-11-14 DIAGNOSIS — I10 ESSENTIAL HYPERTENSION: Primary | ICD-10-CM

## 2019-11-14 LAB
ALBUMIN SERPL-MCNC: 4.1 G/DL (ref 3.5–5.2)
ALP BLD-CCNC: 90 U/L (ref 35–104)
ALT SERPL-CCNC: 34 U/L (ref 0–32)
ANION GAP SERPL CALCULATED.3IONS-SCNC: 13 MMOL/L (ref 7–16)
AST SERPL-CCNC: 43 U/L (ref 0–31)
BASOPHILS ABSOLUTE: 0.03 E9/L (ref 0–0.2)
BASOPHILS RELATIVE PERCENT: 0.5 % (ref 0–2)
BILIRUB SERPL-MCNC: 0.4 MG/DL (ref 0–1.2)
BUN BLDV-MCNC: 18 MG/DL (ref 8–23)
CALCIUM SERPL-MCNC: 9.9 MG/DL (ref 8.6–10.2)
CHLORIDE BLD-SCNC: 104 MMOL/L (ref 98–107)
CO2: 26 MMOL/L (ref 22–29)
CREAT SERPL-MCNC: 1.3 MG/DL (ref 0.5–1)
EKG ATRIAL RATE: 77 BPM
EKG P AXIS: 76 DEGREES
EKG P-R INTERVAL: 220 MS
EKG Q-T INTERVAL: 368 MS
EKG QRS DURATION: 92 MS
EKG QTC CALCULATION (BAZETT): 416 MS
EKG R AXIS: -36 DEGREES
EKG T AXIS: 80 DEGREES
EKG VENTRICULAR RATE: 77 BPM
EOSINOPHILS ABSOLUTE: 0.13 E9/L (ref 0.05–0.5)
EOSINOPHILS RELATIVE PERCENT: 2 % (ref 0–6)
GFR AFRICAN AMERICAN: 47
GFR NON-AFRICAN AMERICAN: 47 ML/MIN/1.73
GLUCOSE BLD-MCNC: 144 MG/DL (ref 74–99)
HCT VFR BLD CALC: 42.8 % (ref 34–48)
HEMOGLOBIN: 14.1 G/DL (ref 11.5–15.5)
IMMATURE GRANULOCYTES #: 0.02 E9/L
IMMATURE GRANULOCYTES %: 0.3 % (ref 0–5)
LYMPHOCYTES ABSOLUTE: 2.66 E9/L (ref 1.5–4)
LYMPHOCYTES RELATIVE PERCENT: 40.1 % (ref 20–42)
MCH RBC QN AUTO: 33 PG (ref 26–35)
MCHC RBC AUTO-ENTMCNC: 32.9 % (ref 32–34.5)
MCV RBC AUTO: 100.2 FL (ref 80–99.9)
MONOCYTES ABSOLUTE: 0.76 E9/L (ref 0.1–0.95)
MONOCYTES RELATIVE PERCENT: 11.5 % (ref 2–12)
NEUTROPHILS ABSOLUTE: 3.03 E9/L (ref 1.8–7.3)
NEUTROPHILS RELATIVE PERCENT: 45.6 % (ref 43–80)
PDW BLD-RTO: 13.1 FL (ref 11.5–15)
PLATELET # BLD: 241 E9/L (ref 130–450)
PMV BLD AUTO: 8.8 FL (ref 7–12)
POTASSIUM SERPL-SCNC: 3.8 MMOL/L (ref 3.5–5)
RBC # BLD: 4.27 E12/L (ref 3.5–5.5)
SODIUM BLD-SCNC: 143 MMOL/L (ref 132–146)
TOTAL PROTEIN: 7.1 G/DL (ref 6.4–8.3)
TROPONIN: <0.01 NG/ML (ref 0–0.03)
WBC # BLD: 6.6 E9/L (ref 4.5–11.5)

## 2019-11-14 PROCEDURE — 84484 ASSAY OF TROPONIN QUANT: CPT

## 2019-11-14 PROCEDURE — 71045 X-RAY EXAM CHEST 1 VIEW: CPT

## 2019-11-14 PROCEDURE — 36415 COLL VENOUS BLD VENIPUNCTURE: CPT

## 2019-11-14 PROCEDURE — 80053 COMPREHEN METABOLIC PANEL: CPT

## 2019-11-14 PROCEDURE — 85025 COMPLETE CBC W/AUTO DIFF WBC: CPT

## 2019-11-14 PROCEDURE — 93005 ELECTROCARDIOGRAM TRACING: CPT | Performed by: NURSE PRACTITIONER

## 2019-11-14 PROCEDURE — 99284 EMERGENCY DEPT VISIT MOD MDM: CPT

## 2019-11-21 ENCOUNTER — HOSPITAL ENCOUNTER (OUTPATIENT)
Age: 84
Discharge: HOME | End: 2019-11-21
Payer: MEDICARE

## 2019-11-21 LAB
T4 FREE: 1.33 NG/DL (ref 0.93–1.7)
TSH SERPL DL<=0.05 MIU/L-ACNC: 3.11 UIU/ML (ref 0.27–4.2)

## 2019-11-21 PROCEDURE — 36415 COLL VENOUS BLD VENIPUNCTURE: CPT

## 2019-11-21 PROCEDURE — 86800 THYROGLOBULIN ANTIBODY: CPT

## 2019-11-21 PROCEDURE — 84443 ASSAY THYROID STIM HORMONE: CPT

## 2019-11-21 PROCEDURE — 86376 MICROSOMAL ANTIBODY EACH: CPT

## 2019-11-21 PROCEDURE — 84439 ASSAY OF FREE THYROXINE: CPT

## 2019-11-23 LAB
THYROGLOBULIN AB: <0.9 IU/ML (ref 0–4)
THYROID PEROXIDASE (TPO) ABS: 0.5 IU/ML (ref 0–9)

## 2020-09-25 ENCOUNTER — HOSPITAL ENCOUNTER (OUTPATIENT)
Age: 85
Discharge: HOME | End: 2020-09-25
Payer: MEDICARE

## 2020-09-25 LAB
ALBUMIN SERPL-MCNC: 4.2 G/DL (ref 3.5–5.2)
ALP BLD-CCNC: 89 U/L (ref 35–104)
ALT SERPL-CCNC: 24 U/L (ref 0–32)
ANION GAP SERPL CALCULATED.3IONS-SCNC: 17 MMOL/L (ref 7–16)
AST SERPL-CCNC: 30 U/L (ref 0–31)
BILIRUB SERPL-MCNC: 0.4 MG/DL (ref 0–1.2)
BUN BLDV-MCNC: 17 MG/DL (ref 8–23)
CALCIUM SERPL-MCNC: 10.1 MG/DL (ref 8.6–10.2)
CHLORIDE BLD-SCNC: 108 MMOL/L (ref 98–107)
CO2: 22 MMOL/L (ref 22–29)
CREAT SERPL-MCNC: 1.3 MG/DL (ref 0.5–1)
CREATININE URINE: 375 MG/DL (ref 29–226)
GFR AFRICAN AMERICAN: 47
GFR NON-AFRICAN AMERICAN: 47 ML/MIN/1.73
GLUCOSE BLD-MCNC: 127 MG/DL (ref 74–99)
HCT VFR BLD CALC: 43.4 % (ref 34–48)
HEMOGLOBIN: 14.3 G/DL (ref 11.5–15.5)
MCH RBC QN AUTO: 34 PG (ref 26–35)
MCHC RBC AUTO-ENTMCNC: 32.9 % (ref 32–34.5)
MCV RBC AUTO: 103.1 FL (ref 80–99.9)
MICROALBUMIN UR-MCNC: 18.1 MG/L
MICROALBUMIN/CREAT UR-RTO: 4.8 (ref 0–30)
PDW BLD-RTO: 13.2 FL (ref 11.5–15)
PLATELET # BLD: 273 E9/L (ref 130–450)
PMV BLD AUTO: 9 FL (ref 7–12)
POTASSIUM SERPL-SCNC: 4.1 MMOL/L (ref 3.5–5)
PROTEIN PROTEIN: 17 MG/DL (ref 0–12)
PROTEIN/CREAT RATIO: 0
PROTEIN/CREAT RATIO: 0 (ref 0–0.2)
RBC # BLD: 4.21 E12/L (ref 3.5–5.5)
SODIUM BLD-SCNC: 147 MMOL/L (ref 132–146)
TOTAL PROTEIN: 7 G/DL (ref 6.4–8.3)
WBC # BLD: 6.6 E9/L (ref 4.5–11.5)

## 2020-09-25 PROCEDURE — 85027 COMPLETE CBC AUTOMATED: CPT

## 2020-09-25 PROCEDURE — 82044 UR ALBUMIN SEMIQUANTITATIVE: CPT

## 2020-09-25 PROCEDURE — 36415 COLL VENOUS BLD VENIPUNCTURE: CPT

## 2020-09-25 PROCEDURE — 84156 ASSAY OF PROTEIN URINE: CPT

## 2020-09-25 PROCEDURE — 82570 ASSAY OF URINE CREATININE: CPT

## 2020-09-25 PROCEDURE — 80053 COMPREHEN METABOLIC PANEL: CPT

## 2021-04-23 VITALS
OXYGEN SATURATION: 96 % | TEMPERATURE: 96.8 F | RESPIRATION RATE: 17 BRPM | SYSTOLIC BLOOD PRESSURE: 157 MMHG | HEART RATE: 96 BPM | DIASTOLIC BLOOD PRESSURE: 90 MMHG

## 2021-04-23 PROCEDURE — 99283 EMERGENCY DEPT VISIT LOW MDM: CPT

## 2021-04-24 ENCOUNTER — APPOINTMENT (OUTPATIENT)
Dept: CT IMAGING | Age: 86
End: 2021-04-24
Payer: MEDICARE

## 2021-04-24 ENCOUNTER — APPOINTMENT (OUTPATIENT)
Dept: GENERAL RADIOLOGY | Age: 86
End: 2021-04-24
Payer: MEDICARE

## 2021-04-24 ENCOUNTER — HOSPITAL ENCOUNTER (EMERGENCY)
Age: 86
Discharge: HOME | End: 2021-04-24
Attending: EMERGENCY MEDICINE
Payer: MEDICARE

## 2021-04-24 DIAGNOSIS — I10 HYPERTENSION, UNSPECIFIED TYPE: Primary | ICD-10-CM

## 2021-04-24 LAB
ALBUMIN SERPL-MCNC: 4 G/DL (ref 3.5–5.2)
ALP BLD-CCNC: 89 U/L (ref 35–104)
ALT SERPL-CCNC: 30 U/L (ref 0–32)
ANION GAP SERPL CALCULATED.3IONS-SCNC: 15 MMOL/L (ref 7–16)
AST SERPL-CCNC: 46 U/L (ref 0–31)
BASOPHILS ABSOLUTE: 0.04 E9/L (ref 0–0.2)
BASOPHILS RELATIVE PERCENT: 0.6 % (ref 0–2)
BILIRUB SERPL-MCNC: 0.4 MG/DL (ref 0–1.2)
BUN BLDV-MCNC: 23 MG/DL (ref 6–23)
CALCIUM SERPL-MCNC: 9.8 MG/DL (ref 8.6–10.2)
CHLORIDE BLD-SCNC: 104 MMOL/L (ref 98–107)
CO2: 21 MMOL/L (ref 22–29)
CREAT SERPL-MCNC: 1.2 MG/DL (ref 0.5–1)
EOSINOPHILS ABSOLUTE: 0.18 E9/L (ref 0.05–0.5)
EOSINOPHILS RELATIVE PERCENT: 2.6 % (ref 0–6)
GFR AFRICAN AMERICAN: 52
GFR NON-AFRICAN AMERICAN: 52 ML/MIN/1.73
GLUCOSE BLD-MCNC: 126 MG/DL (ref 74–99)
HCT VFR BLD CALC: 39.5 % (ref 34–48)
HEMOGLOBIN: 13.4 G/DL (ref 11.5–15.5)
IMMATURE GRANULOCYTES #: 0.01 E9/L
IMMATURE GRANULOCYTES %: 0.1 % (ref 0–5)
LIPASE: 39 U/L (ref 13–60)
LYMPHOCYTES ABSOLUTE: 3.43 E9/L (ref 1.5–4)
LYMPHOCYTES RELATIVE PERCENT: 50.3 % (ref 20–42)
MCH RBC QN AUTO: 33.2 PG (ref 26–35)
MCHC RBC AUTO-ENTMCNC: 33.9 % (ref 32–34.5)
MCV RBC AUTO: 97.8 FL (ref 80–99.9)
MONOCYTES ABSOLUTE: 0.69 E9/L (ref 0.1–0.95)
MONOCYTES RELATIVE PERCENT: 10.1 % (ref 2–12)
NEUTROPHILS ABSOLUTE: 2.47 E9/L (ref 1.8–7.3)
NEUTROPHILS RELATIVE PERCENT: 36.3 % (ref 43–80)
PDW BLD-RTO: 12.9 FL (ref 11.5–15)
PLATELET # BLD: 246 E9/L (ref 130–450)
PMV BLD AUTO: 9 FL (ref 7–12)
POTASSIUM REFLEX MAGNESIUM: 4.9 MMOL/L (ref 3.5–5)
RBC # BLD: 4.04 E12/L (ref 3.5–5.5)
SODIUM BLD-SCNC: 140 MMOL/L (ref 132–146)
TOTAL PROTEIN: 7 G/DL (ref 6.4–8.3)
TROPONIN: <0.01 NG/ML (ref 0–0.03)
WBC # BLD: 6.8 E9/L (ref 4.5–11.5)

## 2021-04-24 PROCEDURE — 83690 ASSAY OF LIPASE: CPT

## 2021-04-24 PROCEDURE — 80053 COMPREHEN METABOLIC PANEL: CPT

## 2021-04-24 PROCEDURE — 84484 ASSAY OF TROPONIN QUANT: CPT

## 2021-04-24 PROCEDURE — 85025 COMPLETE CBC W/AUTO DIFF WBC: CPT

## 2021-04-24 PROCEDURE — 70450 CT HEAD/BRAIN W/O DYE: CPT

## 2021-04-24 PROCEDURE — 71045 X-RAY EXAM CHEST 1 VIEW: CPT

## 2021-04-24 PROCEDURE — 93005 ELECTROCARDIOGRAM TRACING: CPT | Performed by: EMERGENCY MEDICINE

## 2021-04-24 NOTE — ED PROVIDER NOTES
HPI:  4/24/21,   Time: 2:41 AM EDT       Kelsey Rodrigues is a 80 y.o. female presenting to the ED for hypertension, beginning on day ago. The complaint has been persistent, mild in severity, and worsened by nothing. The patient states that starting at approximately 4 PM today she just fell off. She states she just did not feel herself. She states that during that time she checked her blood pressure and her blood pressure was high. She states she took extra doses of her carvedilol with minimal improvement. Due to her elevated blood pressures she came to the ED to be evaluated. Denies any chest pain shortness of breath. No nausea vomiting. No diaphoresis. No numbness tingling. No focal neurological deficits. No vision changes. No dizziness. No syncope. Review of Systems:   Pertinent positives and negatives are stated within HPI, all other systems reviewed and are negative.          --------------------------------------------- PAST HISTORY ---------------------------------------------  Past Medical History:  has a past medical history of Hypertension, Left cataract, and Type 2 diabetes mellitus with renal manifestations ([facility] [address] Past Surgical History:  has a past surgical history that includes Hysterectomy; Cholecystectomy; Cataract removal with implant (Right); and Colonoscopy. Social History:  reports that she has quit smoking. She has never used smokeless tobacco. She reports current alcohol use. She reports that she does not use drugs. Family History: family history includes Coronary Art Dis in her brother and sister; Diabetes in her brother, brother, brother, brother, brother, and sister; High Blood Pressure in her brother, brother, brother, brother, and sister; Kidney Disease in her brother and brother; Stroke (age of onset: 40) in her brother. The patients home medications have been reviewed. Allergies:  Barbiturates, Phenobarbital, and Valium [diazepam]        ---------------------------------------------------PHYSICAL EXAM--------------------------------------    Constitutional/General: Alert and oriented x3, well appearing, non toxic in NAD  Head: Normocephalic and atraumatic  Eyes: PERRL, EOMI, conjunctive normal, sclera non icteric  Mouth: Oropharynx clear, handling secretions, no trismus, no asymmetry of the posterior oropharynx or uvular edema  Neck: Supple, full ROM, non tender to palpation in the midline, no stridor, no crepitus, no meningeal signs  Respiratory: Lungs clear to auscultation bilaterally, no wheezes, rales, or rhonchi. Not in respiratory distress  Cardiovascular:  Regular rate. Regular rhythm. No murmurs, gallops, or rubs. 2+ distal pulses  GI:  Abdomen Soft, Non tender, Non distended. +BS. No organomegaly, no palpable masses,  No rebound, guarding, or rigidity. Musculoskeletal: Moves all extremities x 4. Warm and well perfused, no clubbing, cyanosis, or edema. Capillary refill <3 seconds  Integument: skin warm and dry. No rashes. Neurologic: GCS 15, no focal deficits, symmetric strength 5/5 in the upper and lower extremities bilaterally  Psychiatric: Normal Affect    -------------------------------------------------- RESULTS -------------------------------------------------  I have personally reviewed all laboratory and imaging results for this patient. Results are listed below.      LABS:  Results for orders placed or performed during the hospital encounter of 04/24/21   CBC Auto Differential   Result Value Ref Range    WBC 6.8 4.5 - 11.5 E9/L    RBC 4.04 3.50 - 5.50 E12/L    Hemoglobin 13.4 11.5 - 15.5 g/dL    Hematocrit 39.5 34.0 - 48.0 %    MCV 97.8 80.0 - 99.9 fL    MCH 33.2 26.0 - 35.0 pg    MCHC 33.9 32.0 - 34.5 %    RDW 12.9 11.5 - 15.0 fL    Platelets 928 992 - 542 E9/L    MPV 9.0 7.0 - 12.0 fL    Neutrophils % 36.3 (L) 43.0 - 80.0 %    Immature Granulocytes % 0.1 0.0 - 5.0 %    Lymphocytes % 50.3 (H) 20.0 - 42.0 % Monocytes % 10.1 2.0 - 12.0 %    Eosinophils % 2.6 0.0 - 6.0 %    Basophils % 0.6 0.0 - 2.0 %    Neutrophils Absolute 2.47 1.80 - 7.30 E9/L    Immature Granulocytes # 0.01 E9/L    Lymphocytes Absolute 3.43 1.50 - 4.00 E9/L    Monocytes Absolute 0.69 0.10 - 0.95 E9/L    Eosinophils Absolute 0.18 0.05 - 0.50 E9/L    Basophils Absolute 0.04 0.00 - 0.20 E9/L   Comprehensive Metabolic Panel w/ Reflex to MG   Result Value Ref Range    Sodium 140 132 - 146 mmol/L    Potassium reflex Magnesium 4.9 3.5 - 5.0 mmol/L    Chloride 104 98 - 107 mmol/L    CO2 21 (L) 22 - 29 mmol/L    Anion Gap 15 7 - 16 mmol/L    Glucose 126 (H) 74 - 99 mg/dL    BUN 23 6 - 23 mg/dL    CREATININE 1.2 (H) 0.5 - 1.0 mg/dL    GFR Non-African American 52 >=60 mL/min/1.73    GFR African American 52     Calcium 9.8 8.6 - 10.2 mg/dL    Total Protein 7.0 6.4 - 8.3 g/dL    Albumin 4.0 3.5 - 5.2 g/dL    Total Bilirubin 0.4 0.0 - 1.2 mg/dL    Alkaline Phosphatase 89 35 - 104 U/L    ALT 30 0 - 32 U/L    AST 46 (H) 0 - 31 U/L   Lipase   Result Value Ref Range    Lipase 39 13 - 60 U/L   Troponin   Result Value Ref Range    Troponin <0.01 0.00 - 0.03 ng/mL   EKG 12 Lead   Result Value Ref Range    Ventricular Rate 68 BPM    Atrial Rate 68 BPM    P-R Interval 236 ms    QRS Duration 78 ms    Q-T Interval 388 ms    QTc Calculation (Bazett) 412 ms    P Axis 64 degrees    R Axis -26 degrees    T Axis 68 degrees       RADIOLOGY:  Interpreted by Radiologist.  XR CHEST PORTABLE   Final Result   No acute process. Stable probable scarring involving both lungs. CT Head WO Contrast   Final Result   No acute intracranial abnormality. Periventricular white matter changes consistent chronic microvascular   disease. Diffuse volume loss. Normal aeration of the paranasal sinuses and mastoid air cells. EKG:  This EKG is signed and interpreted by the EP. EKG shows normal sinus rhythm first-degree AV block at 68 bpm.  Normal axis. Normal QRS. Nonspecific ST and T wave changes noted in aVL. No STEMI.    ------------------------- NURSING NOTES AND VITALS REVIEWED ---------------------------   The nursing notes within the ED encounter and vital signs as below have been reviewed by myself. BP (!) 157/90   Pulse 96   Temp 96.8 °F (36 °C)   Resp 17   LMP  (LMP Unknown)   SpO2 96%   Oxygen Saturation Interpretation: Normal    The patients available past medical records and past encounters were reviewed. ------------------------------ ED COURSE/MEDICAL DECISION MAKING----------------------  Medications - No data to display      ED COURSE:       Medical Decision Making: This is an 27-year-old female presented to the ED for elevated blood pressure. Patient underwent laboratory work-up which showed a normal CBC. Normal chemistry except for bicarb at 21 and creatinine of 1.2 with an AST of 46. Troponin negative. EKG showed no ischemic findings. Chest x-ray unremarkable. Head CT showed no acute abnormalities. Chronic microvascular disease noted. On reevaluation the patient remains asymptomatic. Therefore the patient be discharged home with PCP follow-up. Return precautions given. Patient agrees with plan. I, Dr. Mark Estrada, am the primary provider for this encounter    This patient's ED course included: a personal history and physicial examination, re-evaluation prior to disposition, multiple bedside re-evaluations, IV medications, cardiac monitoring and continuous pulse oximetry    This patient has remained hemodynamically stable during their ED course. Re-Evaluations:             Re-evaluation. Patients symptoms are improving      Counseling: The emergency provider has spoken with the patient and discussed todays results, in addition to providing specific details for the plan of care and counseling regarding the diagnosis and prognosis. Questions are answered at this time and they are agreeable with the plan. --------------------------------- IMPRESSION AND DISPOSITION ---------------------------------    IMPRESSION  1. Hypertension, unspecified type        DISPOSITION  Disposition: Discharge to home  Patient condition is stable    NOTE: This report was transcribed using voice recognition software.  Every effort was made to ensure accuracy; however, inadvertent computerized transcription errors may be present        Cornel Hansen DO  04/24/21 0445

## 2021-04-25 LAB
EKG ATRIAL RATE: 68 BPM
EKG P AXIS: 64 DEGREES
EKG P-R INTERVAL: 236 MS
EKG Q-T INTERVAL: 388 MS
EKG QRS DURATION: 78 MS
EKG QTC CALCULATION (BAZETT): 412 MS
EKG R AXIS: -26 DEGREES
EKG T AXIS: 68 DEGREES
EKG VENTRICULAR RATE: 68 BPM

## 2021-04-25 PROCEDURE — 93010 ELECTROCARDIOGRAM REPORT: CPT | Performed by: INTERNAL MEDICINE

## 2021-06-03 ENCOUNTER — HOSPITAL ENCOUNTER (OUTPATIENT)
Age: 86
Discharge: HOME | End: 2021-06-03
Payer: MEDICARE

## 2021-06-03 LAB — C-REACTIVE PROTEIN: 0.3 MG/DL (ref 0–0.4)

## 2021-06-03 PROCEDURE — 86140 C-REACTIVE PROTEIN: CPT

## 2021-06-03 PROCEDURE — 36415 COLL VENOUS BLD VENIPUNCTURE: CPT

## 2022-01-11 ENCOUNTER — HOSPITAL ENCOUNTER (OUTPATIENT)
Age: 87
Discharge: HOME | End: 2022-01-11
Payer: MEDICARE

## 2022-01-11 LAB
ALBUMIN SERPL-MCNC: 4.2 G/DL (ref 3.5–5.2)
ALBUMIN SERPL-MCNC: 4.4 G/DL (ref 3.5–5.2)
ALP BLD-CCNC: 75 U/L (ref 35–104)
ALP BLD-CCNC: 76 U/L (ref 35–104)
ALT SERPL-CCNC: 18 U/L (ref 0–32)
ALT SERPL-CCNC: 18 U/L (ref 0–32)
ANION GAP SERPL CALCULATED.3IONS-SCNC: 12 MMOL/L (ref 7–16)
ANION GAP SERPL CALCULATED.3IONS-SCNC: 12 MMOL/L (ref 7–16)
AST SERPL-CCNC: 25 U/L (ref 0–31)
AST SERPL-CCNC: 25 U/L (ref 0–31)
BACTERIA: ABNORMAL /HPF
BASOPHILS ABSOLUTE: 0.04 E9/L (ref 0–0.2)
BASOPHILS RELATIVE PERCENT: 0.9 % (ref 0–2)
BILIRUB SERPL-MCNC: 0.6 MG/DL (ref 0–1.2)
BILIRUB SERPL-MCNC: 0.6 MG/DL (ref 0–1.2)
BILIRUBIN URINE: NEGATIVE
BLOOD, URINE: NEGATIVE
BUN BLDV-MCNC: 14 MG/DL (ref 6–23)
BUN BLDV-MCNC: 14 MG/DL (ref 6–23)
CALCIUM SERPL-MCNC: 10.2 MG/DL (ref 8.6–10.2)
CALCIUM SERPL-MCNC: 10.3 MG/DL (ref 8.6–10.2)
CHLORIDE BLD-SCNC: 103 MMOL/L (ref 98–107)
CHLORIDE BLD-SCNC: 104 MMOL/L (ref 98–107)
CHOLESTEROL, TOTAL: 248 MG/DL (ref 0–199)
CLARITY: CLEAR
CO2: 26 MMOL/L (ref 22–29)
CO2: 26 MMOL/L (ref 22–29)
COLOR: YELLOW
CREAT SERPL-MCNC: 1.2 MG/DL (ref 0.5–1)
CREAT SERPL-MCNC: 1.2 MG/DL (ref 0.5–1)
CREATININE URINE: 287 MG/DL (ref 29–226)
EOSINOPHILS ABSOLUTE: 0.15 E9/L (ref 0.05–0.5)
EOSINOPHILS RELATIVE PERCENT: 3.2 % (ref 0–6)
EPITHELIAL CELLS, UA: ABNORMAL /HPF
GFR AFRICAN AMERICAN: 51
GFR AFRICAN AMERICAN: 51
GFR NON-AFRICAN AMERICAN: 51 ML/MIN/1.73
GFR NON-AFRICAN AMERICAN: 51 ML/MIN/1.73
GLUCOSE BLD-MCNC: 109 MG/DL (ref 74–99)
GLUCOSE FASTING: 109 MG/DL (ref 74–99)
GLUCOSE URINE: NEGATIVE MG/DL
HBA1C MFR BLD: 6.5 % (ref 4–5.6)
HCT VFR BLD CALC: 46.1 % (ref 34–48)
HDLC SERPL-MCNC: 92 MG/DL
HEMOGLOBIN: 15 G/DL (ref 11.5–15.5)
IMMATURE GRANULOCYTES #: 0 E9/L
IMMATURE GRANULOCYTES %: 0 % (ref 0–5)
KETONES, URINE: NEGATIVE MG/DL
LDL CHOLESTEROL CALCULATED: 135 MG/DL (ref 0–99)
LEUKOCYTE ESTERASE, URINE: ABNORMAL
LYMPHOCYTES ABSOLUTE: 2.77 E9/L (ref 1.5–4)
LYMPHOCYTES RELATIVE PERCENT: 59.1 % (ref 20–42)
MAGNESIUM: 2.3 MG/DL (ref 1.6–2.6)
MCH RBC QN AUTO: 32.6 PG (ref 26–35)
MCHC RBC AUTO-ENTMCNC: 32.5 % (ref 32–34.5)
MCV RBC AUTO: 100.2 FL (ref 80–99.9)
MONOCYTES ABSOLUTE: 0.54 E9/L (ref 0.1–0.95)
MONOCYTES RELATIVE PERCENT: 11.5 % (ref 2–12)
NEUTROPHILS ABSOLUTE: 1.19 E9/L (ref 1.8–7.3)
NEUTROPHILS RELATIVE PERCENT: 25.3 % (ref 43–80)
NITRITE, URINE: NEGATIVE
PARATHYROID HORMONE INTACT: 37 PG/ML (ref 15–65)
PDW BLD-RTO: 13.4 FL (ref 11.5–15)
PH UA: 6 (ref 5–9)
PLATELET # BLD: 252 E9/L (ref 130–450)
PMV BLD AUTO: 9.2 FL (ref 7–12)
POTASSIUM SERPL-SCNC: 4 MMOL/L (ref 3.5–5)
POTASSIUM SERPL-SCNC: 4 MMOL/L (ref 3.5–5)
PROTEIN PROTEIN: 18 MG/DL (ref 0–12)
PROTEIN UA: NEGATIVE MG/DL
PROTEIN/CREAT RATIO: 0.1
PROTEIN/CREAT RATIO: 0.1 (ref 0–0.2)
RBC # BLD: 4.6 E12/L (ref 3.5–5.5)
RBC UA: ABNORMAL /HPF (ref 0–2)
SODIUM BLD-SCNC: 141 MMOL/L (ref 132–146)
SODIUM BLD-SCNC: 142 MMOL/L (ref 132–146)
SPECIFIC GRAVITY UA: 1.02 (ref 1–1.03)
TOTAL PROTEIN: 7.3 G/DL (ref 6.4–8.3)
TOTAL PROTEIN: 7.3 G/DL (ref 6.4–8.3)
TRIGL SERPL-MCNC: 106 MG/DL (ref 0–149)
URIC ACID, SERUM: 5.9 MG/DL (ref 2.4–5.7)
UROBILINOGEN, URINE: 0.2 E.U./DL
VITAMIN D 25-HYDROXY: 79 NG/ML (ref 30–100)
VLDLC SERPL CALC-MCNC: 21 MG/DL
WBC # BLD: 4.7 E9/L (ref 4.5–11.5)
WBC UA: ABNORMAL /HPF (ref 0–5)

## 2022-01-11 PROCEDURE — 80061 LIPID PANEL: CPT

## 2022-01-11 PROCEDURE — 82570 ASSAY OF URINE CREATININE: CPT

## 2022-01-11 PROCEDURE — 85025 COMPLETE CBC W/AUTO DIFF WBC: CPT

## 2022-01-11 PROCEDURE — 83735 ASSAY OF MAGNESIUM: CPT

## 2022-01-11 PROCEDURE — 81001 URINALYSIS AUTO W/SCOPE: CPT

## 2022-01-11 PROCEDURE — 84156 ASSAY OF PROTEIN URINE: CPT

## 2022-01-11 PROCEDURE — 83036 HEMOGLOBIN GLYCOSYLATED A1C: CPT

## 2022-01-11 PROCEDURE — 82306 VITAMIN D 25 HYDROXY: CPT

## 2022-01-11 PROCEDURE — 36415 COLL VENOUS BLD VENIPUNCTURE: CPT

## 2022-01-11 PROCEDURE — 84550 ASSAY OF BLOOD/URIC ACID: CPT

## 2022-01-11 PROCEDURE — 83970 ASSAY OF PARATHORMONE: CPT

## 2022-01-11 PROCEDURE — 82652 VIT D 1 25-DIHYDROXY: CPT

## 2022-01-11 PROCEDURE — 80053 COMPREHEN METABOLIC PANEL: CPT

## 2022-01-18 LAB — VITAMIN D 1,25-DIHYDROXY: 36.1 PG/ML (ref 19.9–79.3)

## 2022-02-01 ENCOUNTER — APPOINTMENT (OUTPATIENT)
Dept: GENERAL RADIOLOGY | Age: 87
End: 2022-02-01
Payer: MEDICARE

## 2022-02-01 ENCOUNTER — HOSPITAL ENCOUNTER (EMERGENCY)
Age: 87
Discharge: HOME | End: 2022-02-01
Payer: MEDICARE

## 2022-02-01 VITALS
OXYGEN SATURATION: 100 % | HEART RATE: 72 BPM | DIASTOLIC BLOOD PRESSURE: 65 MMHG | SYSTOLIC BLOOD PRESSURE: 134 MMHG | RESPIRATION RATE: 18 BRPM
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
## 2022-02-01 DIAGNOSIS — S42.254A CLOSED NONDISPLACED FRACTURE OF GREATER TUBEROSITY OF RIGHT HUMERUS, INITIAL ENCOUNTER: Primary | ICD-10-CM

## 2022-02-01 PROCEDURE — 73030 X-RAY EXAM OF SHOULDER: CPT

## 2022-02-01 PROCEDURE — 6370000000 HC RX 637 (ALT 250 FOR IP): Performed by: NURSE PRACTITIONER

## 2022-02-01 PROCEDURE — 73060 X-RAY EXAM OF HUMERUS: CPT

## 2022-02-01 PROCEDURE — 73070 X-RAY EXAM OF ELBOW: CPT

## 2022-02-01 PROCEDURE — 99284 EMERGENCY DEPT VISIT MOD MDM: CPT

## 2022-02-01 PROCEDURE — 73090 X-RAY EXAM OF FOREARM: CPT

## 2022-02-01 RX ORDER — OXYCODONE HYDROCHLORIDE AND ACETAMINOPHEN 5; 325 MG/1; MG/1
1 TABLET ORAL EVERY 6 HOURS PRN
Qty: 10 TABLET | Refills: 0 | Status: SHIPPED | OUTPATIENT
Start: 2022-02-01 | End: 2022-02-11 | Stop reason: SDUPTHER

## 2022-02-01 RX ORDER — OXYCODONE HYDROCHLORIDE AND ACETAMINOPHEN 5; 325 MG/1; MG/1
1 TABLET ORAL ONCE
Status: COMPLETED | OUTPATIENT
Start: 2022-02-01 | End: 2022-02-01

## 2022-02-01 RX ADMIN — OXYCODONE AND ACETAMINOPHEN 1 TABLET: 5; 325 TABLET ORAL at 20:07

## 2022-02-01 ASSESSMENT — PAIN SCALES - GENERAL: PAINLEVEL_OUTOF10: 10

## 2022-02-01 NOTE — ED PROVIDER NOTES
Independent    HPI:  2/1/22, Time: 6:52 PM EST Milo Lombard is a 80 y.o. female presenting to the ED for mechanical fall. Patient presents emergency department with complaints of fall and complaining of right arm pain. Patient reports that she was walking and saw a car and was trying to get out of its way causing herself to slip on the ice. Patient states she did not strike her head in fact when she landed she landed more on her right side primarily striking her right arm and states the entire time she kept her head up. Patient denies loss consciousness. She denied feeling weak or dizzy prior to this fall. She denies any other areas of injury including none noted to the lumbar spine, hip, pelvis or lower extremities. Patient is not on any anticoagulant therapy. Patient reports that occurred prior to arrival.  Patient did not take anything for pain. Patient reports pain is to the right forearm, right elbow and right humerus. She does not have any unusual paresthesia and no gross deformity noted on exam.  Symptoms mild in severity and persistent. Review of Systems:   A complete review of systems was performed and pertinent positives and negatives are stated within HPI, all other systems reviewed and are negative.          --------------------------------------------- PAST HISTORY ---------------------------------------------  Past Medical History:  has a past medical history of Hypertension, Left cataract, and Type 2 diabetes mellitus with renal manifestations ([facility] [address] Past Surgical History:  has a past surgical history that includes Hysterectomy; Cholecystectomy; Cataract removal with implant (Right); and Colonoscopy. Social History:  reports that she has quit smoking. She has never used smokeless tobacco. She reports current alcohol use. She reports that she does not use drugs.     Family History: family history includes Coronary Art Dis in her brother and sister; Diabetes in her brother, brother, brother, brother, brother, and sister; High Blood Pressure in her brother, brother, brother, brother, and sister; Kidney Disease in her brother and brother; Stroke (age of onset: 40) in her brother. The patients home medications have been reviewed. Allergies: Barbiturates, Phenobarbital, and Valium [diazepam]    -------------------------------------------------- RESULTS -------------------------------------------------  All laboratory and radiology results have been personally reviewed by myself   LABS:  No results found for this visit on 02/01/22. RADIOLOGY:  Interpreted by Radiologist.  XR SHOULDER RIGHT (MIN 2 VIEWS)   Final Result   1. Fractures of the right humeral neck extending to the greater tuberosity. 2. The remainder of the right humerus, right elbow and right forearm are   intact. XR RADIUS ULNA RIGHT (2 VIEWS)   Final Result   1. Fractures of the right humeral neck extending to the greater tuberosity. 2. The remainder of the right humerus, right elbow and right forearm are   intact. XR HUMERUS RIGHT (MIN 2 VIEWS)   Final Result   1. Fractures of the right humeral neck extending to the greater tuberosity. 2. The remainder of the right humerus, right elbow and right forearm are   intact. XR ELBOW RIGHT (2 VIEWS)   Final Result   1. Fractures of the right humeral neck extending to the greater tuberosity. 2. The remainder of the right humerus, right elbow and right forearm are   intact.             ------------------------- NURSING NOTES AND VITALS REVIEWED ---------------------------   The nursing notes within the ED encounter and vital signs as below have been reviewed.    /65   Pulse 72   Resp 18   LMP  (LMP Unknown)   SpO2 100%   Oxygen Saturation Interpretation: Normal      ---------------------------------------------------PHYSICAL EXAM--------------------------------------      Constitutional/General: Alert and oriented x3 NAD moderately uncomfortable  Head: Normocephalic and atraumatic  Eyes: PERRL, EOMI  Mouth: Oropharynx clear, handling secretions, no trismus  Neck: Supple, full ROM,   Pulmonary: Lungs clear to auscultation bilaterally, no wheezes, rales, or rhonchi. Not in respiratory distress  Cardiovascular:  Regular rate and rhythm, no murmurs, gallops, or rubs. 2+ distal pulses  Abdomen: Soft, non tender, non distended,   Extremities: Moves all extremities x 4. Warm and well perfused, patient with significant point tenderness to right proximal humerus, unable to even attempt to straight arm raise second to increased pain. Right radial pulse 2+. Is able to slightly perform pronation and supination but guarded secondary to increased pain to upper humerus. Compartments soft otherwise full sensations intact. Skin: warm and dry without rash  Neurologic: GCS 15, cranial nerves II through XII grossly intact. No acute neurovascular deficit noted. Speech clear and coherent strength strong and equal bilaterally  Psych: Normal Affect      ------------------------------ ED COURSE/MEDICAL DECISION MAKING----------------------  Medications   oxyCODONE-acetaminophen (PERCOCET) 5-325 MG per tablet 1 tablet (1 tablet Oral Given 2/1/22 2007)         ED COURSE:       Medical Decision Making: Plan will be for imaging, x-ray of the right shoulder showing fracture of the right humeral neck extending into the greater tuberosity. The remainder of the humerus, right elbow and right forearm are all intact. Patient was made aware of this finding. The sling was placed and patient was educated on nonweightbearing status to right upper extremity, importance of rest, ice as well as following up with the orthopedic surgeon. Patient will be discharged home with prescription for Percocet. Patient otherwise neurovascularly intact she is able to ambulate with slow and steady gait.   Patient expressed understanding of good follow-up care as well as when to return. Patient will be safely discharged home. Counseling: The emergency provider has spoken with the patient and discussed todays results, in addition to providing specific details for the plan of care and counseling regarding the diagnosis and prognosis. Questions are answered at this time and they are agreeable with the plan.      --------------------------------- IMPRESSION AND DISPOSITION ---------------------------------    IMPRESSION  1. Closed nondisplaced fracture of greater tuberosity of right humerus, initial encounter    2. Fall, initial encounter        DISPOSITION  Disposition: Discharge to home  Patient condition is good      NOTE: This report was transcribed using voice recognition software.  Every effort was made to ensure accuracy; however, inadvertent computerized transcription errors may be present     JOSE Ma CNP  02/02/22 JOSE Mendoza CNP  02/02/22 [id number]

## 2022-02-07 ENCOUNTER — TELEPHONE (OUTPATIENT)
Dept: ORTHOPEDIC SURGERY | Age: 87
End: 2022-02-07

## 2022-02-07 NOTE — TELEPHONE ENCOUNTER
Patient presented to the ED for mechanical fall on 2-1-22      XR SHOULDER RIGHT (MIN 2 VIEWS)   Final Result   1. Fractures of the right humeral neck extending to the greater tuberosity. 2. The remainder of the right humerus, right elbow and right forearm are   intact.           XR RADIUS ULNA RIGHT (2 VIEWS)   Final Result   1. Fractures of the right humeral neck extending to the greater tuberosity. 2. The remainder of the right humerus, right elbow and right forearm are   intact.           XR HUMERUS RIGHT (MIN 2 VIEWS)   Final Result   1. Fractures of the right humeral neck extending to the greater tuberosity. 2. The remainder of the right humerus, right elbow and right forearm are   intact.           XR ELBOW RIGHT (2 VIEWS)   Final Result   1. Fractures of the right humeral neck extending to the greater tuberosity. 2. The remainder of the right humerus, right elbow and right forearm are   intact. Routed to Providers for consideration and scheduling recommendations.

## 2022-02-08 NOTE — TELEPHONE ENCOUNTER
Call placed to patient. Future Appointments   Date Time Provider Laura Santos   2/11/2022  9:00 AM SCHEDULE, [facility]     Directions to office provided and patient verbalized understanding and is agreeable with plan.

## 2022-02-10 DIAGNOSIS — S42.211A CLOSED FRACTURE OF NECK OF RIGHT HUMERUS, INITIAL ENCOUNTER: Primary | ICD-10-CM

## 2022-02-11 ENCOUNTER — OFFICE VISIT (OUTPATIENT)
Dept: ORTHOPEDIC SURGERY | Age: 87
End: 2022-02-11
Payer: MEDICARE

## 2022-02-11 ENCOUNTER — HOSPITAL ENCOUNTER (OUTPATIENT)
Dept: GENERAL RADIOLOGY | Age: 87
Discharge: HOME | End: 2022-02-13
Payer: MEDICARE

## 2022-02-11 DIAGNOSIS — S42.211A CLOSED FRACTURE OF NECK OF RIGHT HUMERUS, INITIAL ENCOUNTER: ICD-10-CM

## 2022-02-11 DIAGNOSIS — S42.254A CLOSED NONDISPLACED FRACTURE OF GREATER TUBEROSITY OF RIGHT HUMERUS, INITIAL ENCOUNTER: ICD-10-CM

## 2022-02-11 DIAGNOSIS — S42.271A CLOSED TORUS FRACTURE OF PROXIMAL END OF RIGHT HUMERUS, INITIAL ENCOUNTER: Primary | ICD-10-CM

## 2022-02-11 PROCEDURE — 73030 X-RAY EXAM OF SHOULDER: CPT

## 2022-02-11 PROCEDURE — 23600 CLTX PROX HUMRL FX W/O MNPJ: CPT | Performed by: ORTHOPAEDIC SURGERY

## 2022-02-11 PROCEDURE — 99203 OFFICE O/P NEW LOW 30 MIN: CPT | Performed by: ORTHOPAEDIC SURGERY

## 2022-02-11 PROCEDURE — 99202 OFFICE O/P NEW SF 15 MIN: CPT

## 2022-02-11 RX ORDER — CHLORHEXIDINE GLUCONATE 0.12 MG/ML
RINSE ORAL
COMMUNITY

## 2022-02-11 RX ORDER — OXYCODONE HYDROCHLORIDE AND ACETAMINOPHEN 5; 325 MG/1; MG/1
1 TABLET ORAL EVERY 6 HOURS PRN
Qty: 28 TABLET | Refills: 0 | Status: SHIPPED | OUTPATIENT
Start: 2022-02-11 | End: 2022-02-18

## 2022-02-11 RX ORDER — CARVEDILOL 25 MG/1
TABLET ORAL
COMMUNITY
Start: 2022-02-06

## 2022-02-11 RX ORDER — TOBRAMYCIN AND DEXAMETHASONE 3; 1 MG/ML; MG/ML
SUSPENSION/ DROPS OPHTHALMIC
COMMUNITY

## 2022-02-11 RX ORDER — FLUTICASONE PROPIONATE 50 MCG
SPRAY, SUSPENSION (ML) NASAL
COMMUNITY
Start: 2022-01-14

## 2022-02-11 NOTE — PROGRESS NOTES
Shantelle Villalta is a 80 y.o. female who presents for evaluation of right arm    Referred from ED    Symptom onset: Date: 2/1/2022  Mechanism of Injury: fall    Previous Treatments: ice, elevation, Oxycodone 5-325mg which have been somewhat effective    Weightbearing: right lower Non-weight bearing    Assistive device: sling  Participating in therapy?  no

## 2022-02-11 NOTE — PATIENT INSTRUCTIONS
Weightbearing: Non-weight bearing - on right upper Extremity    Pain medication Per Prescriptions: refills sent to pharmacy today    Ice to operative/injured site for 15-30 minutes of each hour for next 5 days    Recommend that you continue to ice the area 2-3 times per day after this     Therapy: will hold off on therapy until 2-3 weeks from injury    Follow up: In 2 weeks with x-rays of the right shoulder    Please call the office at [phone] or send [facility] message to providers sooner with any questions or concerns  Strongly recommend all of our patients sign up for [facility] in order to have direct communication VIA [facility] with our clinic staff.

## 2022-02-11 NOTE — PROGRESS NOTES
Chief Complaint   Patient presents with   [facility] ED Follow-up     right humeral neck fx 2/1/2022       SUBJECTIVE: Patient is a very pleasant 51-year-old female who fell in a parking lot about 10 days ago. She suffered a right proximal humerus fracture which is minimally displaced. Involves the proximal humerus as well as the greater tuberosity. She is comfortable in a sling today. She was taking Percocet and went through her first prescription I would like more those. Otherwise she denies any further injury. She denies any elbow wrist or hand pain. She denies any other pain elsewhere. Patient is right-hand dominant.     Past Medical History:   Diagnosis Date    Hypertension     pt states controlled    Left cataract     Type 2 diabetes mellitus with renal manifestations ([facility] [address] 12/31/2015     Past Surgical History:   Procedure Laterality Date    CATARACT REMOVAL WITH IMPLANT Right     CHOLECYSTECTOMY      COLONOSCOPY      HYSTERECTOMY       Family History   Problem Relation Age of Onset    High Blood Pressure Sister     Diabetes Sister     Coronary Art Dis Sister     Diabetes Brother     High Blood Pressure Brother     Diabetes Brother     High Blood Pressure Brother     Stroke Brother 40    High Blood Pressure Brother     Kidney Disease Brother     Diabetes Brother     Diabetes Brother     Coronary Art Dis Brother     Kidney Disease Brother     High Blood Pressure Brother     Diabetes Brother      Social History     Tobacco Use    Smoking status: Former Smoker    Smokeless tobacco: Never Used    Tobacco comment: quit more than 30 years ago smoking   Vaping Use    Vaping Use: Never used   Substance Use Topics    Alcohol use: Yes     Comment: occasionally    Drug use: No     Allergies   Allergen Reactions    Barbiturates Rash and Other (See Comments)     On limbs  On limbs      Hydralazine Rash    Benzodiazepines     Ethanol     Valsartan Other (See Comments)    Phenobarbital Rash  Valium [Diazepam] Rash                                                                                                                                                                                                                               Review of Systems   Constitutional: Negative for fever, chills, diaphoresis, appetite change and fatigue. HENT: Negative for dental issues, hearing loss and tinnitus. Negative for congestion, sinus pressure, sneezing, sore throat. Negative for headache. Eyes: Negative for visual disturbance, blurred and double vision. Negative for pain, discharge, redness and itching  Respiratory: Negative for cough, shortness of breath and wheezing. Cardiovascular: Negative for chest pain, palpitations and leg swelling. No dyspnea on exertion   Gastrointestinal:   Negative for nausea, vomiting, abdominal pain, diarrhea, constipation  or black or bloody. Hematologic\Lymphatic:  negative for bleeding, petechiae,   Genitourinary: Negative for hematuria and difficulty urinating. Musculoskeletal: Negative for neck pain and stiffness. Negative for back pain, see HPI  Skin: Negative for pallor, rash and wound. Neurological: Negative for dizziness, tremors, seizures, weakness, light-headedness, no TIA or stroke symptoms. No numbness and headaches. Psychiatric/Behavioral: Negative. OBJECTIVE:      Physical Examination:   General appearance: alert, well appearing, and in no distress,  normal appearing weight.  No visible signs of trauma   Mental status: alert, oriented to person, place, and time, normal mood, behavior, speech, dress, motor activity, and thought processes  Abdomen: soft, nondistended  Resp:   resp easy and unlabored, no audible wheezes note, normal symmetrical expansion of both hemithoraces  Cardiac: distal pulses palpable, skin and extremities well perfused  Neurological: alert, oriented X3, normal speech, no focal findings or movement disorder noted, motor and sensory grossly normal bilaterally, normal muscle tone, no tremors, strength 5/5, normal gait and station  HEENT: normochephalic atraumatic, external ears and eyes normal, sclera normal, neck supple, no nasal discharge. Extremities:   peripheral pulses normal, no edema, redness or tenderness in the calves   Skin: normal coloration, no rashes or open wounds, no suspicious skin lesions noted  Psych: Affect euthymic   Musculoskeletal:   Extremity:  Right upper extremity   Skin intact, mild ecchymosis with no significant swelling   Patient with tenderness palpation about the proximal humerus. No significant pain with gentle range of motion   No wrist elbow or hand pain on palpation   Compartments soft and compressible   Fires M U R nerves   2/4 radial pulse   Sensation intact   Capillary refill less than 3 seconds        LMP  (LMP Unknown)      XR: 2/11/22   X-rays show a proximal humerus fracture through the surgical neck with the involved greater tuberosity fracture which is minimally displaced. Currently her overall alignment is acceptable for nonoperative treatment. ASSESSMENT:     Diagnosis Orders   1. Closed torus fracture of proximal end of right humerus, initial encounter  MT CLOSED RX PROX HUMERUS FRACTURE    XR SHOULDER RIGHT (MIN 2 VIEWS)   2. Closed nondisplaced fracture of greater tuberosity of right humerus, initial encounter  oxyCODONE-acetaminophen (PERCOCET) 5-325 MG per tablet        Discussion: Had a long discussion with the patient about nonoperative treatment of her right proximal humerus fracture. We will keep in a sling for another 2 weeks. She is to come out 2 or 3 times a day for range of motion wrist elbow and fingers which she states she has been doing. When she came back to her next visit we will take x-rays of the maintain alignment have early callus will start physical therapy for the proximal humerus protocol around the week  four area.   She is agreeable with the plan.    PLAN:  Nonweightbearing right upper extremity    Come out of the sling 2 to 3 times a day for range of motion wrist elbow and fingers    Follow-up in 2 weeks at that point will most likely initiate occupational therapy.           ELECTRONICALLY signed by:    Magan Stiles MD  2/11/22

## 2022-02-23 ENCOUNTER — OFFICE VISIT (OUTPATIENT)
Dept: ORTHOPEDIC SURGERY | Age: 87
End: 2022-02-23
Payer: MEDICARE

## 2022-02-23 ENCOUNTER — HOSPITAL ENCOUNTER (OUTPATIENT)
Dept: GENERAL RADIOLOGY | Age: 87
Discharge: HOME | End: 2022-02-25
Payer: MEDICARE

## 2022-02-23 DIAGNOSIS — S42.271A CLOSED TORUS FRACTURE OF PROXIMAL END OF RIGHT HUMERUS, INITIAL ENCOUNTER: ICD-10-CM

## 2022-02-23 DIAGNOSIS — S42.254A CLOSED NONDISPLACED FRACTURE OF GREATER TUBEROSITY OF RIGHT HUMERUS, INITIAL ENCOUNTER: Primary | ICD-10-CM

## 2022-02-23 PROCEDURE — 73030 X-RAY EXAM OF SHOULDER: CPT

## 2022-02-23 PROCEDURE — 99212 OFFICE O/P EST SF 10 MIN: CPT | Performed by: ORTHOPAEDIC SURGERY

## 2022-02-23 PROCEDURE — 99213 OFFICE O/P EST LOW 20 MIN: CPT | Performed by: PHYSICIAN ASSISTANT

## 2022-02-23 NOTE — PROGRESS NOTES
Ana Castanon is a 80 y.o. female who presents for follow up of right humeral neck fx,, non-op    SURGEON: Dr. Tawana Rowell MD  Date of Injury: 2/1/2022  Date last seen in office: 2/11/2022    Symptoms: better  New complaints: none    Weightbearing: right upper Non-weight bearing      Assistive device Sling  Participating in therapy (location if yes)?  no    Refills Needed: None  Order/Referral Needed: N/A

## 2022-02-23 NOTE — PROGRESS NOTES
Chief Complaint   Patient presents with    Shoulder Pain     Right humeral neck fx, non-op 2/1/2022       SUBJECTIVE: Evelia Mchugh presents today for a follow-up visit for a right proximal humerus fracture treated nonoperatively. DOI: 2-1-22. She has been wearing a sling for comfort. She states that her shoulder is feeling better. Denies numbness, tingling or paresthesias in the right arm. She has been working on range of motion of the elbow, wrist and digits. No other orthopedic complaints at this time. Review of Systems -   General ROS: negative for - chills, fatigue, fever or night sweats  Respiratory ROS: no cough, shortness of breath, or wheezing  Cardiovascular ROS: no chest pain or dyspnea on exertion  Gastrointestinal ROS: no abdominal pain, change in bowel habits, or black or bloody stools  Genitourinary: no hematuria, dysuria, or incontinence   Musculoskeletal ROS:see above  Neurological ROS: no TIA or stroke symptoms     OBJECTIVE:   Alert and oriented X 3, no acute distress, respirations easy and unlabored with no audible wheezes, skin warm and dry, speech and dress appropriate for noted age, affect euthymic. Extremity:  Right Upper Extremity  Skin is clean dry and intact  no edema noted  Radial pulse palpable, fingers warm with BCR  Flex/extension intact to wrist, thumb and fingers  Finger opposition intact  Finger adduction/abduction intact  Finger crossover intact  Subjectively states sensation intact to radial/medial/ulnar distribution  Good motion of the elbow, wrist and digits  No tenderness over the [facility] joint. XR: 2/23/22   2 views right shoulder demonstrate stable healing fractures of the right humeral neck and greater tuberosity. No acute complicating features. LMP  (LMP Unknown)     ASSESSMENT:   Diagnosis Orders   1.  Closed nondisplaced fracture of greater tuberosity of right humerus, initial encounter  [address] [facility], [facility]     PLAN:  X-rays reviewed and discussed. Continue nonweightbearing right upper extremity. Patient will be started in PT/OT at [facility] following the proximal humerus fracture protocol at the 3/4-week [place]. Okay to wean out of the sling. Follow-up in 4 weeks for reevaluation and x-rays. Call if any questions or concerns. Electronically signed by NEAL Rasmussen on 2/23/2022 at 10:42 AM  Note: This report was completed using [employer] voiced recognition software. Every effort has been made to ensure accuracy; however, inadvertent computerized transcription errors may be present.

## 2022-02-23 NOTE — PATIENT INSTRUCTIONS
Continue nonweightbearing right upper extremity. Patient will be started in PT/OT at [facility] following the proximal humerus fracture protocol at the 3/4-week [place]. Okay to wean out of the sling. Follow-up in 4 weeks for reevaluation and x-rays. Call if any questions or concerns.

## 2022-03-15 ENCOUNTER — HOSPITAL ENCOUNTER (OUTPATIENT)
Dept: GENERAL RADIOLOGY | Age: 87
Discharge: HOME | End: 2022-03-17
Payer: MEDICARE

## 2022-03-15 ENCOUNTER — APPOINTMENT (OUTPATIENT)
Dept: GENERAL RADIOLOGY | Age: 87
End: 2022-03-15
Payer: MEDICARE

## 2022-03-15 ENCOUNTER — HOSPITAL ENCOUNTER (OUTPATIENT)
Age: 87
Discharge: HOME | End: 2022-03-17
Payer: MEDICARE

## 2022-03-15 ENCOUNTER — TELEPHONE (OUTPATIENT)
Dept: ORTHOPEDIC SURGERY | Age: 87
End: 2022-03-15

## 2022-03-15 DIAGNOSIS — J31.0 CHRONIC RHINITIS: ICD-10-CM

## 2022-03-15 DIAGNOSIS — S42.254A CLOSED NONDISPLACED FRACTURE OF GREATER TUBEROSITY OF RIGHT HUMERUS, INITIAL ENCOUNTER: Primary | ICD-10-CM

## 2022-03-15 PROCEDURE — 70210 X-RAY EXAM OF SINUSES: CPT

## 2022-03-23 ENCOUNTER — OFFICE VISIT (OUTPATIENT)
Dept: ORTHOPEDIC SURGERY | Age: 87
End: 2022-03-23
Payer: MEDICARE

## 2022-03-23 ENCOUNTER — HOSPITAL ENCOUNTER (OUTPATIENT)
Dept: GENERAL RADIOLOGY | Age: 87
Discharge: HOME | End: 2022-03-25
Payer: MEDICARE

## 2022-03-23 DIAGNOSIS — S42.254A CLOSED NONDISPLACED FRACTURE OF GREATER TUBEROSITY OF RIGHT HUMERUS, INITIAL ENCOUNTER: ICD-10-CM

## 2022-03-23 DIAGNOSIS — S42.254D CLOSED NONDISPLACED FRACTURE OF GREATER TUBEROSITY OF RIGHT HUMERUS WITH ROUTINE HEALING, SUBSEQUENT ENCOUNTER: Primary | ICD-10-CM

## 2022-03-23 DIAGNOSIS — S42.211D FX HUMERAL NECK, RIGHT, WITH ROUTINE HEALING, SUBSEQUENT ENCOUNTER: ICD-10-CM

## 2022-03-23 PROCEDURE — 73030 X-RAY EXAM OF SHOULDER: CPT

## 2022-03-23 PROCEDURE — 99024 POSTOP FOLLOW-UP VISIT: CPT | Performed by: PHYSICIAN ASSISTANT

## 2022-03-23 PROCEDURE — 99212 OFFICE O/P EST SF 10 MIN: CPT | Performed by: PHYSICIAN ASSISTANT

## 2022-03-23 NOTE — PATIENT INSTRUCTIONS
[facility]   [address] [facility], [address] [place] [address]   [phone]

## 2022-03-23 NOTE — PROGRESS NOTES
Chief Complaint   Patient presents with    Arm Pain     Right humeral neck fx 2/1/2022       Subjective: Nate Cross is approximately 6 to 7 weeks from above date of injury. Patient states that she has no pain she has been nonweightbearing to the right upper extremity with no range of motion above shoulder height. States that physical therapy did call her to schedule however she stated that she wanted to start therapy in the next 1 to 2 weeks and physical therapy never called her back. Patient did not follow-up on this. Patient is not in physical therapy currently. No new injuries or any new complaints. States that she does not have any paresthesias to the right upper extremity. Review of Systems -  all pertinent positives and negatives in HPI. Objective:    General: Alert and oriented X 3, normocephalic atraumatic, external ears and eye normal, sclera clear, no acute distress, respirations easy and unlabored with no audible wheezes, skin warm and dry, speech and dress appropriate for noted age, affect euthymic. Extremity:  Right Upper Extremity  Skin is clean dry and intact  No edema noted  Radial pulse palpable, fingers warm with BCR  Full AROM to elbow, wrist, thumb and fingers  Finger opposition intact  Finger adduction/abduction intact  Finger crossover intact  Subjectively states sensation intact to radial/medial/ulnar distribution  nontender at the right shoulder or right proximal arm  Active range of motion right shoulder flexion to 100 degrees, abduction to 130, internal rotation to 70, external rotation 80      XR:   3 views of R shoulder demonstrating interval healing of proximal humerus and greater tuberosity fracture. No significant change in alignment. No acute fractures or dislocations or any other osseus abnormality identified. Assessment:   Diagnosis Orders   1.  Closed nondisplaced fracture of greater tuberosity of right humerus with routine healing, subsequent encounter  [facility], [address]    XR SHOULDER RIGHT (MIN 2 VIEWS)   2. Fx humeral neck, right, with routine healing, subsequent encounter  [facility], [address]    XR SHOULDER RIGHT (MIN 2 VIEWS)       Plan:   Reviewed x-rays with patient today in office    Can weight-bear up to 5 to 10 pounds if tolerable to the right upper extremity   No restriction to range of motion at this point   PT referral again placed today. Address and phone number given to patient in AVS.  Advised to call the office if physical therapy does not call the patient to schedule. Patient verbalizes understanding. Follow up in 4 months with XR of the R shoulder     Electronically signed by Yoel Marrero PA-C on 3/23/2022 at 2:17 PM  Note: This report was completed using [employer] voiced recognition software. Every effort has been made to ensure accuracy; however, inadvertent computerized transcription errors may be present.

## 2022-03-28 ENCOUNTER — HOSPITAL ENCOUNTER (OUTPATIENT)
Dept: PHYSICAL THERAPY | Age: 87
Setting detail: THERAPIES SERIES
Discharge: HOME | End: 2022-03-28
Payer: MEDICARE

## 2022-03-28 ASSESSMENT — PAIN SCALES - GENERAL: PAINLEVEL_OUTOF10: 3

## 2022-03-28 ASSESSMENT — PAIN DESCRIPTION - PAIN TYPE: TYPE: ACUTE PAIN

## 2022-03-28 ASSESSMENT — PAIN - FUNCTIONAL ASSESSMENT: PAIN_FUNCTIONAL_ASSESSMENT: PREVENTS OR INTERFERES WITH MANY ACTIVE NOT PASSIVE ACTIVITIES

## 2022-03-28 ASSESSMENT — PAIN DESCRIPTION - DESCRIPTORS: DESCRIPTORS: ACHING

## 2022-03-28 ASSESSMENT — PAIN DESCRIPTION - ORIENTATION: ORIENTATION: LEFT

## 2022-03-28 NOTE — PROGRESS NOTES
Physical Therapy  Initial Assessment  Date: 3/28/2022  Patient Name: Savannah Buerger  MRN: [id number]  : 1935        Subjective   General  Chart Reviewed: Yes  Referring Practitioner: Fabiano Maldonado   Referral Date : 22  Diagnosis: R humeral fx  PT Visit Information  PT Insurance Information: [employer]na Medicare                    cert dates:    to  22                      ICD-10:  S42.254D  Total # of Visits Approved: 12  Total # of Visits to Date: 1  Subjective  Subjective: pt presents to therapy s/p R humeral fx suffered 22 due to fall; went to ER and was sent  home with + x-ray of humeral fx; has been management non-operatively at this time with good healing noted; presents with little to no pain in the arm; no PMH for L UE/neck per pt; no MEDS at this time; no c/o N/T or clicking in the shoulder/UE; sleep is fine; RTD for follow-up 22  Pain Screening  Patient Currently in Pain: Yes  Pain Assessment  Pain Assessment: 0-10  Pain Level: 3  Pain Type: Acute pain  Pain Orientation: Left  Pain Descriptors: Aching  Functional Pain Assessment: Prevents or interferes with many active not passive activities  Vital Signs  Patient Currently in Pain: Yes    Objective     Observation/Palpation  Palpation: discomfort noted across ant/lateral poles of R shoulder into upper trap and AC jt  Observation: posture:  humeral head in good anatomical position in standing    AROM RLE (degrees)  RLE AROM: WNL  AROM LLE (degrees)  LLE AROM : WNL  AROM RUE (degrees)  RUE General AROM: WNL for all ranges R elbow/wrist; R shoulder:  flex/abd=  120*/120*, IR/ER= WFL/WFL  AROM LUE (degrees)  LUE AROM : WNL  Spine  Cervical: WNL for all ranges    Strength Other  Other: grossly 3+/5 for all ranges R shoulder; 4+/5 for all ranges R elbow/wrist     Additional Measures  Other: endurance for all prolonged activities is FAIR+  Sensation  Overall Sensation Status: [facility]     Assessment   Conditions Requiring Skilled Therapeutic Intervention  Body structures, Functions, Activity limitations: Decreased ROM; Decreased strength;Decreased endurance  Assessment: pain across R shoulder with all prolonged activities, 3/10  Prognosis: Good  Decision Making: Low Complexity  Activity Tolerance  Activity Tolerance: Patient Tolerated treatment well       Plan   Plan  Times per week: pt to be seen 2x/week/4-6 weeks  Current Treatment Recommendations: ROM,Strengthening,Endurance Training,Modalities,Home Exercise Program    Goals  Time Frame for goals: 4-6 weeks  goal 1: decrease pain across R shoulder with all prolonged activities, 0-2/10  goal 2: restore R shoulder AROM to WNL for all ranges  goal 3: increase strength across R shoulder to grossly 4, 4+/5 for all ranges  goal 4: improve endurance for all prolonged activities to GOOD  goal 5: assure I with HEP for home management of condition    Patient goals : to restore normal motion in R shoulder             [employer]  T: [phone]   F: [phone]     If you have any questions or concerns, please don't hesitate to call. Thank you for your referral.    Physician Signature:________________________________Date:__________________  By signing above, therapists plan is approved by physician. All patients under [employer]   must be signed by physician.

## 2022-03-28 NOTE — PROGRESS NOTES
[address] [facility]                Phone: [phone]   Fax: [phone]    Physical Therapy Daily Treatment Note  Date:  3/28/2022    Patient Name:  Nate Cross    :    MRN: [id number]    Evaluating therapist:  KRIS Loyola                     (3/28/22)  Restrictions/Precautions:    Diagnosis:  s/p R humeral fx  Treatment Diagnosis:    Insurance/Certification information:  [employer] Medicare                    cert dates:    to  22                      ICD-10:  C72.882E  Referring Physician:  Amadeo Garcia Plan of care signed (Y/N):  Y  Visit# / total visits:    Pain level: 3/10   Time In:  Time Out:    Subjective:      Exercises:  Exercise/Equipment Resistance/Repetitions Other comments   UBE              pulleys for flex/abd            pendulums            table st:  flex                    abd                     ER     shrugs     scap ret            supine wand flex                                                  Other Therapeutic Activities:      Home Exercise Program:  provided 3/28/22    Manual Treatments:      Modalities:  IFC/MH to R shoulder    Timed Code Treatment Minutes: Total Treatment Minutes:      Treatment/Activity Tolerance:  [] Patient tolerated treatment well [] Patient limited by fatique  [] Patient limited by pain  [] Patient limited by other medical complications  [] Other:     Prognosis: [] Good [] Fair  [] Poor    Patient Requires Follow-up: [] Yes  [] No    Plan:   [] Continue per plan of care [] Alter current plan (see comments)  [] Plan of care initiated [] Hold pending MD visit [] Discharge  Plan for Next Session:      See Weekly Progress Note: []  Yes  []  No  Next due:        Electronically signed by:   Lincoln Ferris PT

## 2022-03-31 ENCOUNTER — HOSPITAL ENCOUNTER (OUTPATIENT)
Dept: PHYSICAL THERAPY | Age: 87
Setting detail: THERAPIES SERIES
Discharge: HOME | End: 2022-03-31
Payer: MEDICARE

## 2022-03-31 PROCEDURE — 97110 THERAPEUTIC EXERCISES: CPT | Performed by: PHYSICAL THERAPIST

## 2022-03-31 NOTE — PROGRESS NOTES
[address] [facility]                Phone: [phone]   Fax: [phone]    Physical Therapy Daily Treatment Note  Date:  3/31/2022    Patient Name:  Kemi Pickard    :  1935  MRN: [id number]    Evaluating therapist:  KRIS Austin                     (3/28/22)  Restrictions/Precautions:    Diagnosis:  s/p R humeral fx  Treatment Diagnosis:    Insurance/Certification information:  [employer] Medicare                    cert dates:  3/42/68  to  22                      ICD-10:  T12.673E  Referring Physician:  Amairani Crump Plan of care signed (Y/N):  Y  Visit# / total visits:    Pain level: 3/10   Time In:  1305  Time Out:  1402    Subjective:      Exercises:  Exercise/Equipment Resistance/Repetitions Other comments   UBE   3 min F/B           pulleys for flex/abd 5 min ea           pendulums 20x2lb           table st:  flex 3x20s                   abd 3x20s                   ER 3x20s    shrugs 15x3s    scap ret 15x3s           supine wand flex  15x3s                                                Other Therapeutic Activities:      Home Exercise Program:  provided 3/28/22     Manual Treatments:      Modalities:  MH to R shoulder    Timed Code Treatment Minutes: Total Treatment Minutes:      Treatment/Activity Tolerance:  [] Patient tolerated treatment well [] Patient limited by fatique  [] Patient limited by pain  [] Patient limited by other medical complications  [] Other:     Prognosis: [] Good [] Fair  [] Poor    Patient Requires Follow-up: [] Yes  [] No    Plan:   [] Continue per plan of care [] Alter current plan (see comments)  [] Plan of care initiated [] Hold pending MD visit [] Discharge  Plan for Next Session:      See Weekly Progress Note: []  Yes  []  No  Next due:        Electronically signed by:   Sue Zaragoza PT

## 2022-04-05 ENCOUNTER — HOSPITAL ENCOUNTER (OUTPATIENT)
Dept: PHYSICAL THERAPY | Age: 87
Setting detail: THERAPIES SERIES
Discharge: HOME | End: 2022-04-05
Payer: MEDICARE

## 2022-04-05 PROCEDURE — 97110 THERAPEUTIC EXERCISES: CPT | Performed by: PHYSICAL THERAPIST

## 2022-04-05 NOTE — PROGRESS NOTES
[address] [facility]                Phone: [phone]   Fax: [phone]    Physical Therapy Daily Treatment Note  Date:  2022    Patient Name:  Suhail Khan    :  1935  MRN: [id number]    Evaluating therapist:  KRIS Conley                     (3/28/22)  Restrictions/Precautions:    Diagnosis:  s/p R humeral fx  Treatment Diagnosis:    Insurance/Certification information:  [employer] Medicare                    cert dates:    to  22                      ICD-10:  V67.037E  Referring Physician:  Arnol Parkinson Plan of care signed (Y/N):  Y  Visit# / total visits:  3/12  Pain level: 3/10   Time In:  1411  Time Out:  1451    Subjective:      Exercises:  Exercise/Equipment Resistance/Repetitions Other comments   UBE   3 min F/B           pulleys for flex/abd 5 min ea           pendulums 20x2lb           table st:  flex 3x20s                   abd 3x20s                   ER 3x20s    shrugs 15x3s    scap ret 15x3s           supine wand flex  15x3s                                                Other Therapeutic Activities:      Home Exercise Program:  provided 3/28/22     Manual Treatments:      Modalities:  MH to R shoulder    Timed Code Treatment Minutes: Total Treatment Minutes:      Treatment/Activity Tolerance:  [] Patient tolerated treatment well [] Patient limited by fatique  [] Patient limited by pain  [] Patient limited by other medical complications  [] Other:     Prognosis: [] Good [] Fair  [] Poor    Patient Requires Follow-up: [] Yes  [] No    Plan:   [] Continue per plan of care [] Alter current plan (see comments)  [] Plan of care initiated [] Hold pending MD visit [] Discharge  Plan for Next Session:      See Weekly Progress Note: []  Yes  []  No  Next due:        Electronically signed by:   Nii Cornelius PT

## 2022-04-07 ENCOUNTER — HOSPITAL ENCOUNTER (OUTPATIENT)
Dept: PHYSICAL THERAPY | Age: 87
Setting detail: THERAPIES SERIES
Discharge: HOME | End: 2022-04-07
Payer: MEDICARE

## 2022-04-07 NOTE — PROGRESS NOTES
[address] [facility]                Phone: [phone]  Fax: [phone]    Physical Therapy  Cancellation/No-show Note  Patient Name:  Karen Camp  :  1935   Date:  2022    For today's appointment patient:  [x]  Cancelled  []  Rescheduled appointment  []  No-show     Reason given by patient:  []  Patient ill  []  Conflicting appointment  []  No transportation    []  Conflict with work  [x]  No reason given  []  Other:     Comments:      Electronically signed by:   Cindy Arevalo PT

## 2022-04-07 NOTE — PROGRESS NOTES
S:  pt presents to therapy for two of two scheduled visits this week; at week's end she reports that her shoulder; pain level given as 3/10 and is less limiting to her; tells PT she is using her arm more at home as well; HEP going well per pt    O:  performed the exercises/tasks as written in the flowsheet for the week ending 4/8/22; initiated HEP for home management of condition; AROM shoulder:  flex/abd= 130*/130*, IR/ER= WFL/WFL; shoulder strength grossly 3+, 4-/5 for all planes     A:  anthony tx well; pt able to perform all requested tasks with good form and pacing noted; R shoulder AROM/strength shows improvement since eval; movement seems smoother and less guarded as well; endurance for all prolonged activities is GOOD-    P:  cont with POC of stretching/strengthening for R shoulder/UE as pt tolerates

## 2022-04-12 ENCOUNTER — HOSPITAL ENCOUNTER (OUTPATIENT)
Dept: PHYSICAL THERAPY | Age: 87
Setting detail: THERAPIES SERIES
Discharge: HOME | End: 2022-04-12
Payer: MEDICARE

## 2022-04-12 PROCEDURE — 97110 THERAPEUTIC EXERCISES: CPT | Performed by: PHYSICAL THERAPIST

## 2022-04-12 NOTE — PROGRESS NOTES
[address] [facility]                Phone: [phone]   Fax: [phone]    Physical Therapy Daily Treatment Note  Date:  2022    Patient Name:  Maureen Bates    :  1935  MRN: [id number]    Evaluating therapist:  KRIS Esteban                     (3/28/22)  Restrictions/Precautions:    Diagnosis:  s/p R humeral fx  Treatment Diagnosis:    Insurance/Certification information:  [employer]na Medicare                    cert dates:    to  22                      ICD-10:  Q40.468Q  Referring Physician:  Yung Stacy Plan of care signed (Y/N):  Y  Visit# / total visits:    Pain level: 3/10   Time In:  1404  Time Out:  1435    Subjective:      Exercises:  Exercise/Equipment Resistance/Repetitions Other comments   UBE   3 min F/B           pulleys for flex/abd 5 min ea           pendulums 20x2lb           table st:  flex 3x20s                   abd 3x20s                   ER 3x20s    shrugs 15x3s    scap ret 15x3s           supine wand flex  15x3s                                                Other Therapeutic Activities:      Home Exercise Program:  provided 3/28/22     Manual Treatments:      Modalities:  MH to R shoulder    Timed Code Treatment Minutes: Total Treatment Minutes:      Treatment/Activity Tolerance:  [] Patient tolerated treatment well [] Patient limited by fatique  [] Patient limited by pain  [] Patient limited by other medical complications  [] Other:     Prognosis: [] Good [] Fair  [] Poor    Patient Requires Follow-up: [] Yes  [] No    Plan:   [] Continue per plan of care [] Alter current plan (see comments)  [] Plan of care initiated [] Hold pending MD visit [] Discharge  Plan for Next Session:      See Weekly Progress Note: []  Yes  []  No  Next due:        Electronically signed by:   Tg Mejia PT

## 2022-04-14 ENCOUNTER — HOSPITAL ENCOUNTER (OUTPATIENT)
Dept: PHYSICAL THERAPY | Age: 87
Setting detail: THERAPIES SERIES
Discharge: HOME | End: 2022-04-14
Payer: MEDICARE

## 2022-04-14 PROCEDURE — 97110 THERAPEUTIC EXERCISES: CPT | Performed by: PHYSICAL THERAPIST

## 2022-04-14 NOTE — PROGRESS NOTES
[address] [facility]                Phone: [phone]   Fax: [phone]    Physical Therapy Daily Treatment Note  Date:  2022    Patient Name:  Madyson Marcus    :  1935  MRN: [id number]    Evaluating therapist:  KRIS Almazan                     (3/28/22)  Restrictions/Precautions:    Diagnosis:  s/p R humeral fx  Treatment Diagnosis:    Insurance/Certification information:  A[employer]na Medicare                    cert dates:    to  22                      ICD-10:  K39.657R  Referring Physician:  Meghan Body Plan of care signed (Y/N):  Y  Visit# / total visits:    Pain level: 3/10   Time In:  1404  Time Out:  1448    Subjective:      Exercises:  Exercise/Equipment Resistance/Repetitions Other comments   UBE   3 min F/B           pulleys for flex/abd 5 min ea           pendulums 20x2lb           table st:  flex 3x20s                   abd 3x20s                   ER 3x20s    shrugs 15x3s    scap ret 15x3s           supine wand flex  15x3s                                                Other Therapeutic Activities:      Home Exercise Program:  provided 3/28/22     Manual Treatments:      Modalities:  MH to R shoulder    Timed Code Treatment Minutes: Total Treatment Minutes:      Treatment/Activity Tolerance:  [] Patient tolerated treatment well [] Patient limited by fatique  [] Patient limited by pain  [] Patient limited by other medical complications  [] Other:     Prognosis: [] Good [] Fair  [] Poor    Patient Requires Follow-up: [] Yes  [] No    Plan:   [] Continue per plan of care [] Alter current plan (see comments)  [] Plan of care initiated [] Hold pending MD visit [] Discharge  Plan for Next Session:      See Weekly Progress Note: []  Yes  []  No  Next due:        Electronically signed by:   Robb Malik, PT

## 2022-04-27 ENCOUNTER — HOSPITAL ENCOUNTER (OUTPATIENT)
Dept: GENERAL RADIOLOGY | Age: 87
Discharge: HOME | End: 2022-04-29
Payer: MEDICARE

## 2022-04-27 ENCOUNTER — OFFICE VISIT (OUTPATIENT)
Dept: ORTHOPEDIC SURGERY | Age: 87
End: 2022-04-27
Payer: MEDICARE

## 2022-04-27 DIAGNOSIS — S42.211D FX HUMERAL NECK, RIGHT, WITH ROUTINE HEALING, SUBSEQUENT ENCOUNTER: ICD-10-CM

## 2022-04-27 DIAGNOSIS — S42.254D CLOSED NONDISPLACED FRACTURE OF GREATER TUBEROSITY OF RIGHT HUMERUS WITH ROUTINE HEALING, SUBSEQUENT ENCOUNTER: ICD-10-CM

## 2022-04-27 DIAGNOSIS — S42.254D CLOSED NONDISPLACED FRACTURE OF GREATER TUBEROSITY OF RIGHT HUMERUS WITH ROUTINE HEALING, SUBSEQUENT ENCOUNTER: Primary | ICD-10-CM

## 2022-04-27 PROCEDURE — 99213 OFFICE O/P EST LOW 20 MIN: CPT | Performed by: PHYSICIAN ASSISTANT

## 2022-04-27 PROCEDURE — 73030 X-RAY EXAM OF SHOULDER: CPT

## 2022-04-27 PROCEDURE — 99212 OFFICE O/P EST SF 10 MIN: CPT

## 2022-04-27 NOTE — PROGRESS NOTES
Chief Complaint   Patient presents with    Follow-up     11  wk f/u With Xrays. ED  2/7(DOI 2-1-22) right humeral neck fx; TTS       SUBJECTIVE: Yaya Mccoy is an 51-year-old female presents today for a follow-up for a right proximal humerus fracture treated nonoperatively. DOI: 2/1/2022. He is now 12 weeks out from the injury. She is doing very well and states that she has regained full motion of the right shoulder without pain. She was discharged from PT and is now doing exercises at home. She states that she is able to do her daily activities without difficulty and is very happy with the progress she has made since the injury. Denies numbness, tingling or paresthesias. No other orthopedic complaints at this time. Review of Systems -   General ROS: negative for - chills, fatigue, fever or night sweats  Respiratory ROS: no cough, shortness of breath, or wheezing  Cardiovascular ROS: no chest pain or dyspnea on exertion  Gastrointestinal ROS: no abdominal pain, change in bowel habits, or black or bloody stools  Genitourinary: no hematuria, dysuria, or incontinence   Musculoskeletal ROS:see above  Neurological ROS: no TIA or stroke symptoms     OBJECTIVE:   Alert and oriented X 3, no acute distress, respirations easy and unlabored with no audible wheezes, skin warm and dry, speech and dress appropriate for noted age, affect euthymic. Extremity:  Right Upper Extremity  Skin is clean dry and intact  no edema noted  Radial pulse palpable, fingers warm with BCR  Flex/extension intact to wrist, thumb and fingers  Finger opposition intact  Finger adduction/abduction intact  Finger crossover intact  Subjectively states sensation intact to radial/medial/ulnar distribution  Actively, she has full ROM of the right shoulder without pain. Good strength with resisted right shoulder abduction and external rotation.   Good motion of the elbow, wrist and digits    XR: 4/27/22   2 views right shoulder demonstrate interval healing of stable proximal humerus and greater tuberosity fractures. No significant change in alignment. No acute fractures or dislocations or any other osseous abnormality identified. LMP  (LMP Unknown)     ASSESSMENT:   Diagnosis Orders   1. Closed nondisplaced fracture of greater tuberosity of right humerus with routine healing, subsequent encounter       PLAN:  X-rays reviewed and discussed. Continue activities as tolerated with the right upper extremity. Continue home exercise program.    Follow-up as needed. Call if any questions or concerns. Electronically signed by NEAL Prabhakar on 4/27/2022 at 1:27 PM  Note: This report was completed using [employer] voiced recognition software. Every effort has been made to ensure accuracy; however, inadvertent computerized transcription errors may be present.

## 2022-04-27 NOTE — PROGRESS NOTES
Patient presents today for her 11  wk f/u With Xrays. ED  2/7(DOI 2-1-22) right humeral neck fx; TTS      Patient states she is doing great, she denies any pain or soreness. She feels it is all healed. Patient states she finished with Therapy a week ago.

## 2022-05-18 ENCOUNTER — HOSPITAL ENCOUNTER (OUTPATIENT)
Dept: PHYSICAL THERAPY | Age: 87
Setting detail: THERAPIES SERIES
Discharge: HOME | End: 2022-05-18

## 2022-05-18 NOTE — PROGRESS NOTES
[address] [facility]                Phone: [phone]   Fax: [phone]    To: Maricarmen Delacruz       From: Nessa Xavier PT,KRIS      Patient: Matt Ku       : 1935  Diagnosis:       Date: 2022  Treatment Diagnosis:  s/p R humeral fx    Physical Therapy Discharge Note    Total Visits to date:   5 Cancels/No-shows to date:      Plan of Care/Treatment to date:  [x] Therapeutic Exercise    [] Modalities:  [x] Therapeutic Activity     [] Ultrasound  [] Electrical Stimulation  [] Gait Training      [] Cervical Traction   [] Lumbar Traction  [] Neuromuscular Re-education  [] Cold/hotpack [] Iontophoresis  [x] Instruction in HEP      Other:  [] Manual Therapy       []    [] Aquatic Therapy       []                            Progress towards goals:  pt reached all stated functional LTG's for therapy and was I with [facility] for home managemet of condition       Goal Status:  [x] Achieved [] Partially Achieved  [] Not Achieved     Patient Status: [] Continue per initial plan of Care     [x] Patient now discharged to [facility] for home management of condition     [] Additional visits requested, Please re-certify for additional visits:          Electronically signed by: Nessa Xavier PT ,MPT     If you have any questions or concerns, please don't hesitate to call.   Thank you for your referral.

## 2022-08-08 ENCOUNTER — HOSPITAL ENCOUNTER (OUTPATIENT)
Age: 87
Discharge: HOME | End: 2022-08-08
Payer: MEDICARE

## 2022-08-08 LAB
ALBUMIN SERPL-MCNC: 4.4 G/DL (ref 3.5–5.2)
ALP BLD-CCNC: 89 U/L (ref 35–104)
ALT SERPL-CCNC: 24 U/L (ref 0–32)
ANION GAP SERPL CALCULATED.3IONS-SCNC: 13 MMOL/L (ref 7–16)
AST SERPL-CCNC: 29 U/L (ref 0–31)
BILIRUB SERPL-MCNC: 0.9 MG/DL (ref 0–1.2)
BUN BLDV-MCNC: 17 MG/DL (ref 6–23)
CALCIUM SERPL-MCNC: 10.2 MG/DL (ref 8.6–10.2)
CHLORIDE BLD-SCNC: 106 MMOL/L (ref 98–107)
CO2: 23 MMOL/L (ref 22–29)
CREAT SERPL-MCNC: 1.5 MG/DL (ref 0.5–1)
GFR AFRICAN AMERICAN: 40
GFR NON-AFRICAN AMERICAN: 40 ML/MIN/1.73
GLUCOSE BLD-MCNC: 118 MG/DL (ref 74–99)
HCT VFR BLD CALC: 42.2 % (ref 34–48)
HEMOGLOBIN: 14 G/DL (ref 11.5–15.5)
MCH RBC QN AUTO: 33.3 PG (ref 26–35)
MCHC RBC AUTO-ENTMCNC: 33.2 % (ref 32–34.5)
MCV RBC AUTO: 100.2 FL (ref 80–99.9)
PDW BLD-RTO: 13.6 FL (ref 11.5–15)
PLATELET # BLD: 295 E9/L (ref 130–450)
PMV BLD AUTO: 9 FL (ref 7–12)
POTASSIUM SERPL-SCNC: 4.7 MMOL/L (ref 3.5–5)
RBC # BLD: 4.21 E12/L (ref 3.5–5.5)
SODIUM BLD-SCNC: 142 MMOL/L (ref 132–146)
TOTAL PROTEIN: 7.4 G/DL (ref 6.4–8.3)
WBC # BLD: 6.1 E9/L (ref 4.5–11.5)

## 2022-08-08 PROCEDURE — 85027 COMPLETE CBC AUTOMATED: CPT

## 2022-08-08 PROCEDURE — 36415 COLL VENOUS BLD VENIPUNCTURE: CPT

## 2022-08-08 PROCEDURE — 80053 COMPREHEN METABOLIC PANEL: CPT

## 2022-10-11 ENCOUNTER — OFFICE VISIT (OUTPATIENT)
Dept: FAMILY MEDICINE CLINIC | Age: 87
End: 2022-10-11
Payer: MEDICARE

## 2022-10-11 VITALS
BODY MASS INDEX: 24.4 KG/M2 | WEIGHT: 155.5 LBS | HEART RATE: 63 BPM | TEMPERATURE: 97.3 F | OXYGEN SATURATION: 100 % | DIASTOLIC BLOOD PRESSURE: 68 MMHG | RESPIRATION RATE: 12 BRPM | HEIGHT: 67 IN | SYSTOLIC BLOOD PRESSURE: 112 MMHG

## 2022-10-11 DIAGNOSIS — E11.29 TYPE 2 DIABETES MELLITUS WITH OTHER KIDNEY COMPLICATION, UNSPECIFIED WHETHER LONG TERM INSULIN USE (HCC): Primary | ICD-10-CM

## 2022-10-11 DIAGNOSIS — F51.01 PRIMARY INSOMNIA: ICD-10-CM

## 2022-10-11 DIAGNOSIS — I10 RESISTANT HYPERTENSION: ICD-10-CM

## 2022-10-11 LAB — HBA1C MFR BLD: 6.3 %

## 2022-10-11 PROCEDURE — 99203 OFFICE O/P NEW LOW 30 MIN: CPT | Performed by: FAMILY MEDICINE

## 2022-10-11 PROCEDURE — 1123F ACP DISCUSS/DSCN MKR DOCD: CPT | Performed by: FAMILY MEDICINE

## 2022-10-11 PROCEDURE — 83036 HEMOGLOBIN GLYCOSYLATED A1C: CPT | Performed by: FAMILY MEDICINE

## 2022-10-11 PROCEDURE — 3044F HG A1C LEVEL LT 7.0%: CPT | Performed by: FAMILY MEDICINE

## 2022-10-11 SDOH — ECONOMIC STABILITY: FOOD INSECURITY: WITHIN THE PAST 12 MONTHS, THE FOOD YOU BOUGHT JUST DIDN'T LAST AND YOU DIDN'T HAVE MONEY TO GET MORE.: NEVER TRUE

## 2022-10-11 SDOH — ECONOMIC STABILITY: FOOD INSECURITY: WITHIN THE PAST 12 MONTHS, YOU WORRIED THAT YOUR FOOD WOULD RUN OUT BEFORE YOU GOT MONEY TO BUY MORE.: NEVER TRUE

## 2022-10-11 ASSESSMENT — ENCOUNTER SYMPTOMS
COUGH: 0
CONSTIPATION: 0
NAUSEA: 0
WHEEZING: 0
DIARRHEA: 0
ABDOMINAL PAIN: 0
SHORTNESS OF BREATH: 0
VOMITING: 0

## 2022-10-11 ASSESSMENT — PATIENT HEALTH QUESTIONNAIRE - PHQ9
2. FEELING DOWN, DEPRESSED OR HOPELESS: 2
SUM OF ALL RESPONSES TO PHQ QUESTIONS 1-9: 2
1. LITTLE INTEREST OR PLEASURE IN DOING THINGS: 0
SUM OF ALL RESPONSES TO PHQ9 QUESTIONS 1 & 2: 2
SUM OF ALL RESPONSES TO PHQ QUESTIONS 1-9: 2

## 2022-10-11 ASSESSMENT — SOCIAL DETERMINANTS OF HEALTH (SDOH): HOW HARD IS IT FOR YOU TO PAY FOR THE VERY BASICS LIKE FOOD, HOUSING, MEDICAL CARE, AND HEATING?: NOT HARD AT ALL

## 2022-10-11 NOTE — PROGRESS NOTES
[facility]  DATE OF VISIT : 10/11/2022    Patient : Tamara Campuzano   Age : 80 y.o.  : 1935   MRN : [id number]   ______________________________________________________________________    Chief Complaint :   Chief Complaint   Patient presents with    New Patient    Sleep Problem       HPI : Tamara Mode is 80 y.o. female who presented to the clinic today for new patient encounter. HTN: Nifedipine 30 mg twice daily, Coreg 25 mg daily, chlorthalidone 12.5 mg daily. NIDDM: Last A1c 2022 6.5. Patient currently not on any medications. Sleep disturbances: Patient is complaining of trouble falling asleep and staying asleep. Patient states that previously her sleep routine was very good and she had no trouble sleeping but once her son moved in with his family she would spend all day laying in bed and doing all her activities from bed and since has been having trouble falling asleep and staying asleep. States that she wakes up multiple times at night sometimes to use the restroom. Patient believes she sleeps about 1 to 2 hours per night and does not nap during the day. She does continue to spend a lot of time in bed doing activities. I reviewed the patient's past medications, allergies and past medical history during this visit.     Past Medical History :    Past Medical History:   Diagnosis Date    Hypertension     pt states controlled    Left cataract     Type 2 diabetes mellitus with renal manifestations ([facility] [address] 2015     Past Surgical History:   Procedure Laterality Date    CATARACT REMOVAL WITH IMPLANT Bilateral     CHOLECYSTECTOMY      COLONOSCOPY      HYSTERECTOMY (CERVIX STATUS UNKNOWN)         Social History :  Social History       Tobacco History       Smoking Status  Former      Smokeless Tobacco Use  Never      Tobacco Comments  quit more than 30 years ago smoking              Alcohol History       Alcohol Use Status  Yes Comment  occasionally              Drug Use       Drug Use Status  No              Sexual Activity       Sexually Active  Not Currently                     Allergies : Allergies   Allergen Reactions    Barbiturates Rash and Other (See Comments)     On limbs  On limbs      Hydralazine Rash    Benzodiazepines     Ethanol     Valsartan Other (See Comments)    Phenobarbital Rash    Valium [Diazepam] Rash       Medication List :    Current Outpatient Medications   Medication Sig Dispense Refill    carvedilol (COREG) 25 MG tablet TAKE ONE TABLET BY MOUTH TWICE A DAY WITH MEALS      Pseudoephedrine-Naproxen Na (ALEVE-D SINUS & COLD PO) one daily      tobramycin-dexamethasone (TOBRADEX) 0.3-0.1 % ophthalmic suspension INSTILL ONE DROP INTO THE RIGHT EYE FOUR TIMES A DAY      fluticasone (FLONASE) 50 MCG/ACT nasal spray USE AS NEEDED      chlorhexidine (PERIDEX) 0.12 % solution USE AS NEEDED      NIFEdipine (ADALAT CC) 30 MG extended release tablet Take 1 tablet by mouth 2 times daily 60 tablet 2    chlorthalidone (HYGROTON) 25 MG tablet Take 0.5 tablets by mouth daily 30 tablet 3    estradiol (ESTRACE) 1 MG tablet Take 1 tablet by mouth daily 30 tablet 2    polyethylene glycol (MIRALAX) powder Take 17 g daily for the next 10 days. Decreased to one half capful once daily once having soft stools. 1 Bottle 0     No current facility-administered medications for this visit. Review of Systems :  Review of Systems   Constitutional:  Negative for chills, fatigue and fever. Respiratory:  Negative for cough, shortness of breath and wheezing. Cardiovascular:  Negative for chest pain, palpitations and leg swelling. Gastrointestinal:  Negative for abdominal pain, constipation, diarrhea, nausea and vomiting. Endocrine: Negative for polydipsia and polyuria. Neurological:  Negative for dizziness, weakness, light-headedness, numbness and headaches. Psychiatric/Behavioral:  Positive for sleep disturbance. ______________________________________________________________________    Physical Exam :    Vitals: /68 (Site: Left Upper Arm, Position: Sitting, Cuff Size: Large Adult)   Pulse 63   Temp 97.3 °F (36.3 °C) (Temporal)   Resp 12   Ht 5' 7\" (1.702 m)   Wt 155 lb 8 oz (70.5 kg)   LMP  (LMP Unknown)   SpO2 100%   BMI 24.35 kg/m²   Physical Exam  Vitals and nursing note reviewed. Constitutional:       General: She is not in acute distress. Appearance: Normal appearance. She is not ill-appearing. Cardiovascular:      Rate and Rhythm: Normal rate and regular rhythm. Pulses: Normal pulses. Heart sounds: Normal heart sounds. No murmur heard. Pulmonary:      Effort: Pulmonary effort is normal. No respiratory distress. Breath sounds: Normal breath sounds. No wheezing. Abdominal:      General: Bowel sounds are normal. There is no distension. Palpations: Abdomen is soft. Tenderness: There is no abdominal tenderness. Musculoskeletal:      Right lower leg: No edema. Left lower leg: No edema. Neurological:      Mental Status: She is alert.         ___________________    Assessment & Plan :    1. Type 2 diabetes mellitus with other kidney complication, unspecified whether long term insulin use (HCC)  -A1c today 6.3  -Continue with lifestyle and dietary modifications  -Continue to monitor off medications. - POCT glycosylated hemoglobin (Hb A1C)    2. Resistant hypertension  -Stable  -Continue present management    3.  Primary insomnia  -Had extensive discussion with patient regarding sleep hygiene as well as sleep habits  -Patient did express understanding  -Did provide patient with written instructions of how to appropriately create a sleep habit as well as things to do and not do prior to bed  -Had extensive discussion with patient regarding how the sleep cycle works and how to use it to create a routine to optimize her sleep  -Discussed with patient why sleep medications at the time are not recommended and why I prefer to avoid them if possible  -Did discuss with patient that she could take melatonin as needed  -We will trial although given recommendations for a month and see how she does with it. Educational materials and/or home exercises printed for patient's review and were included in patient instructions on his/her After Visit Summary and given to patient at the end of visit. Counseled regarding above diagnosis, including possible risks and complications,  especially if left uncontrolled. Counseled regarding the possible side effects, risks, benefits and alternatives to treatment; patient and/or guardian verbalizes understanding, agrees, feels comfortable with and wishes to proceed with above treatment plan. Advised patient to call with any new medication issues, and read all Rx info from pharmacy to assure aware of all possible risks and side effects of medication before taking. Reviewed age and gender appropriate health screening exams and vaccinations. Advised patient regarding importance of keeping up with recommended health maintenance and to schedule as soon as possible if overdue, as this is important in assessing for undiagnosed pathology, especially cancer, as well as protecting against potentially harmful/life threatening disease. Patient and/or guardian verbalizes understanding and agrees with above counseling, assessment and plan. All questions answered    Additional plan and future considerations:   RTO in 1 month for annual wellness visit and insomnia    Return to Office: Return in about 4 weeks (around 11/8/2022) for AWV.     Electronically signed by Anne Camejo MD on 10/11/2022 at 12:13 PM

## 2022-10-11 NOTE — PATIENT INSTRUCTIONS
Sleep Hygiene     - No caffeine for 6-8hrs before bed  - No food or heavy drinks at least 2hrs before bed  - No light stimulation for at least 1.5-2hrs before bed (TV, Ipads, Computers, Phones)  - Create a sleep routine (go to bed every night at the same time). - take a warm shower 30-45min before bed  - can drink warm tea 1hr before bed (chamomile or lavender- or something relaxing)  - Can take melatonin up to 10mg 30-40min before bed  - Can use a sleep machine (white noise)  - If you spend 20min in bed and cannot fall asleep go to the couch and find something boring to do if you cannot fall asleep  - DO NOT: watch tv, read, play games, hang out in bed.

## 2022-11-16 ENCOUNTER — OFFICE VISIT (OUTPATIENT)
Dept: FAMILY MEDICINE CLINIC | Age: 87
End: 2022-11-16
Payer: MEDICARE

## 2022-11-16 VITALS
HEART RATE: 62 BPM | BODY MASS INDEX: 24.3 KG/M2 | DIASTOLIC BLOOD PRESSURE: 70 MMHG | TEMPERATURE: 97.3 F | OXYGEN SATURATION: 95 % | WEIGHT: 154.8 LBS | HEIGHT: 67 IN | SYSTOLIC BLOOD PRESSURE: 128 MMHG | RESPIRATION RATE: 12 BRPM

## 2022-11-16 DIAGNOSIS — F51.01 PRIMARY INSOMNIA: Primary | ICD-10-CM

## 2022-11-16 PROCEDURE — 99213 OFFICE O/P EST LOW 20 MIN: CPT | Performed by: FAMILY MEDICINE

## 2022-11-16 PROCEDURE — 1123F ACP DISCUSS/DSCN MKR DOCD: CPT | Performed by: FAMILY MEDICINE

## 2022-11-16 ASSESSMENT — ENCOUNTER SYMPTOMS
WHEEZING: 0
COUGH: 0
ABDOMINAL PAIN: 0
CONSTIPATION: 0
NAUSEA: 0
VOMITING: 0
SHORTNESS OF BREATH: 0
DIARRHEA: 0

## 2022-11-16 NOTE — PROGRESS NOTES
[facility]  DATE OF VISIT : 2022    Patient : Rachel Murphy   Age : 80 y.o.  : 1935   MRN : [id number]   ______________________________________________________________________    Chief Complaint :   Chief Complaint   Patient presents with    Follow-up Chronic Condition     Patient is here today for a follow up for chronic conditions. States she has not been checking her sugars at home. Blood pressure seems to be well controlled with medications. Medications reviewed and has stopped some meds, updated in her med list.         HPI : Rachel Murphy is 80 y.o. female who presented to the clinic today for insomnia. Insomnia: Patient and I had a very extensive discussion regarding sleep hygiene during last visit. Today was about 1 month follow-up to see how she was doing with it. She states she has been having significantly better sleep, last night she slept through the night about 7 to 8 hours for the first time in years. Has been applying all of the changes that we discussed during last visit. I reviewed the patient's past medications, allergies and past medical history during this visit.     Past Medical History :  Past Medical History:   Diagnosis Date    Hypertension     pt states controlled    Left cataract     Type 2 diabetes mellitus with renal manifestations ([facility] [address] 2015     Past Surgical History:   Procedure Laterality Date    CATARACT REMOVAL WITH IMPLANT Bilateral     CHOLECYSTECTOMY      COLONOSCOPY      HYSTERECTOMY (CERVIX STATUS UNKNOWN)         Social History :  Social History       Tobacco History       Smoking Status  Former Smoking Tobacco Type  Cigarettes      Smokeless Tobacco Use  Never      Tobacco Comments  quit more than 30 years ago smoking              Alcohol History       Alcohol Use Status  Yes Comment  occasionally              Drug Use       Drug Use Status  No              Sexual Activity       Sexually Active  Not Currently                     Allergies : Allergies   Allergen Reactions    Barbiturates Rash and Other (See Comments)     On limbs  On limbs      Hydralazine Rash    Benzodiazepines     Ethanol     Valsartan Other (See Comments)    Phenobarbital Rash    Valium [Diazepam] Rash       Medication List :    Current Outpatient Medications   Medication Sig Dispense Refill    carvedilol (COREG) 25 MG tablet TAKE ONE TABLET BY MOUTH TWICE A DAY WITH MEALS      tobramycin-dexamethasone (TOBRADEX) 0.3-0.1 % ophthalmic suspension INSTILL ONE DROP INTO THE RIGHT EYE FOUR TIMES A DAY      fluticasone (FLONASE) 50 MCG/ACT nasal spray USE AS NEEDED      NIFEdipine (ADALAT CC) 30 MG extended release tablet Take 1 tablet by mouth 2 times daily 60 tablet 2    polyethylene glycol (MIRALAX) powder Take 17 g daily for the next 10 days. Decreased to one half capful once daily once having soft stools. 1 Bottle 0    Pseudoephedrine-Naproxen Na (ALEVE-D SINUS & COLD PO) one daily (Patient not taking: Reported on 11/16/2022)      chlorhexidine (PERIDEX) 0.12 % solution USE AS NEEDED (Patient not taking: Reported on 11/16/2022)      chlorthalidone (HYGROTON) 25 MG tablet Take 0.5 tablets by mouth daily (Patient not taking: Reported on 11/16/2022) 30 tablet 3    estradiol (ESTRACE) 1 MG tablet Take 1 tablet by mouth daily (Patient not taking: Reported on 11/16/2022) 30 tablet 2     No current facility-administered medications for this visit. Review of Systems :  Review of Systems   Constitutional:  Negative for chills, fatigue and fever. Respiratory:  Negative for cough, shortness of breath and wheezing. Cardiovascular:  Negative for chest pain, palpitations and leg swelling. Gastrointestinal:  Negative for abdominal pain, constipation, diarrhea, nausea and vomiting. Endocrine: Negative for polydipsia and polyuria. Neurological:  Negative for dizziness, weakness, light-headedness, numbness and headaches. ______________________________________________________________________    Physical Exam :    Vitals: /70 (Site: Left Upper Arm, Position: Sitting, Cuff Size: Large Adult)   Pulse 62   Temp 97.3 °F (36.3 °C) (Temporal)   Resp 12   Ht 5' 7\" (1.702 m)   Wt 154 lb 12.8 oz (70.2 kg)   LMP  (LMP Unknown)   SpO2 95%   BMI 24.25 kg/m²   Physical Exam  Vitals reviewed. Constitutional:       General: She is not in acute distress. Appearance: Normal appearance. She is not ill-appearing. Cardiovascular:      Rate and Rhythm: Normal rate and regular rhythm. Pulses: Normal pulses. Heart sounds: Normal heart sounds. No murmur heard. Pulmonary:      Effort: Pulmonary effort is normal. No respiratory distress. Breath sounds: Normal breath sounds. No wheezing. Abdominal:      General: Bowel sounds are normal. There is no distension. Palpations: Abdomen is soft. Tenderness: There is no abdominal tenderness. Musculoskeletal:      Right lower leg: No edema. Left lower leg: No edema. Neurological:      Mental Status: She is alert.     ___________________    Assessment & Plan :    1. Primary insomnia  -Improving with implemented sleep hygiene. Educational materials and/or home exercises printed for patient's review and were included in patient instructions on his/her After Visit Summary and given to patient at the end of visit. Counseled regarding above diagnosis, including possible risks and complications,  especially if left uncontrolled. Counseled regarding the possible side effects, risks, benefits and alternatives to treatment; patient and/or guardian verbalizes understanding, agrees, feels comfortable with and wishes to proceed with above treatment plan. Advised patient to call with any new medication issues, and read all Rx info from pharmacy to assure aware of all possible risks and side effects of medication before taking.      Reviewed age and gender appropriate health screening exams and vaccinations. Advised patient regarding importance of keeping up with recommended health maintenance and to schedule as soon as possible if overdue, as this is important in assessing for undiagnosed pathology, especially cancer, as well as protecting against potentially harmful/life threatening disease. Patient and/or guardian verbalizes understanding and agrees with above counseling, assessment and plan. All questions answered    Additional plan and future considerations:   RTO in 5 months for diabetic follow-up. Return to Office: No follow-ups on file.     Electronically signed by Evert Goncalves MD on 11/16/2022 at 2:37 PM

## 2023-02-08 ENCOUNTER — HOSPITAL ENCOUNTER (OUTPATIENT)
Age: 88
Discharge: HOME | End: 2023-02-08
Payer: MEDICARE

## 2023-02-08 LAB
ALBUMIN SERPL-MCNC: 4.3 G/DL (ref 3.5–5.2)
ALP BLD-CCNC: 114 U/L (ref 35–104)
ALT SERPL-CCNC: 24 U/L (ref 0–32)
ANION GAP SERPL CALCULATED.3IONS-SCNC: 14 MMOL/L (ref 7–16)
AST SERPL-CCNC: 25 U/L (ref 0–31)
BACTERIA: ABNORMAL /HPF
BILIRUB SERPL-MCNC: 0.3 MG/DL (ref 0–1.2)
BILIRUBIN URINE: NEGATIVE
BLOOD, URINE: NEGATIVE
BUN BLDV-MCNC: 21 MG/DL (ref 6–23)
CALCIUM SERPL-MCNC: 9.4 MG/DL (ref 8.6–10.2)
CHLORIDE BLD-SCNC: 111 MMOL/L (ref 98–107)
CHOLESTEROL, TOTAL: 217 MG/DL (ref 0–199)
CLARITY: CLEAR
CO2: 23 MMOL/L (ref 22–29)
COLOR: YELLOW
CREAT SERPL-MCNC: 1.4 MG/DL (ref 0.5–1)
CREATININE URINE: 228 MG/DL (ref 29–226)
GFR SERPL CREATININE-BSD FRML MDRD: 36 ML/MIN/1.73
GLUCOSE BLD-MCNC: 83 MG/DL (ref 74–99)
GLUCOSE URINE: NEGATIVE MG/DL
HBA1C MFR BLD: 6.3 % (ref 4–5.6)
HCT VFR BLD CALC: 42.3 % (ref 34–48)
HDLC SERPL-MCNC: 95 MG/DL
HEMOGLOBIN: 14 G/DL (ref 11.5–15.5)
KETONES, URINE: ABNORMAL MG/DL
LDL CHOLESTEROL CALCULATED: 104 MG/DL (ref 0–99)
LEUKOCYTE ESTERASE, URINE: ABNORMAL
MCH RBC QN AUTO: 32.6 PG (ref 26–35)
MCHC RBC AUTO-ENTMCNC: 33.1 % (ref 32–34.5)
MCV RBC AUTO: 98.6 FL (ref 80–99.9)
NITRITE, URINE: NEGATIVE
PARATHYROID HORMONE INTACT: 54 PG/ML (ref 15–65)
PDW BLD-RTO: 13.4 FL (ref 11.5–15)
PH UA: 5.5 (ref 5–9)
PHOSPHORUS: 3.1 MG/DL (ref 2.5–4.5)
PLATELET # BLD: 245 E9/L (ref 130–450)
PMV BLD AUTO: 9.2 FL (ref 7–12)
POTASSIUM SERPL-SCNC: 4.1 MMOL/L (ref 3.5–5)
PROTEIN PROTEIN: 18 MG/DL (ref 0–12)
PROTEIN UA: NEGATIVE MG/DL
PROTEIN/CREAT RATIO: 0.1
PROTEIN/CREAT RATIO: 0.1 (ref 0–0.2)
RBC # BLD: 4.29 E12/L (ref 3.5–5.5)
RBC UA: ABNORMAL /HPF (ref 0–2)
SODIUM BLD-SCNC: 148 MMOL/L (ref 132–146)
SPECIFIC GRAVITY UA: 1.02 (ref 1–1.03)
TOTAL PROTEIN: 7.2 G/DL (ref 6.4–8.3)
TRIGL SERPL-MCNC: 90 MG/DL (ref 0–149)
UROBILINOGEN, URINE: 0.2 E.U./DL
VITAMIN D 25-HYDROXY: 57 NG/ML (ref 30–100)
VLDLC SERPL CALC-MCNC: 18 MG/DL
WBC # BLD: 5.3 E9/L (ref 4.5–11.5)
WBC UA: ABNORMAL /HPF (ref 0–5)

## 2023-02-08 PROCEDURE — 36415 COLL VENOUS BLD VENIPUNCTURE: CPT

## 2023-02-08 PROCEDURE — 83036 HEMOGLOBIN GLYCOSYLATED A1C: CPT

## 2023-02-08 PROCEDURE — 82306 VITAMIN D 25 HYDROXY: CPT

## 2023-02-08 PROCEDURE — 80053 COMPREHEN METABOLIC PANEL: CPT

## 2023-02-08 PROCEDURE — 82570 ASSAY OF URINE CREATININE: CPT

## 2023-02-08 PROCEDURE — 84100 ASSAY OF PHOSPHORUS: CPT

## 2023-02-08 PROCEDURE — 81001 URINALYSIS AUTO W/SCOPE: CPT

## 2023-02-08 PROCEDURE — 84156 ASSAY OF PROTEIN URINE: CPT

## 2023-02-08 PROCEDURE — 85027 COMPLETE CBC AUTOMATED: CPT

## 2023-02-08 PROCEDURE — 80061 LIPID PANEL: CPT

## 2023-02-08 PROCEDURE — 83970 ASSAY OF PARATHORMONE: CPT

## 2023-05-05 ENCOUNTER — OFFICE VISIT (OUTPATIENT)
Age: 88
End: 2023-05-05
Payer: MEDICARE

## 2023-05-05 VITALS
DIASTOLIC BLOOD PRESSURE: 72 MMHG | HEIGHT: 66 IN | WEIGHT: 164 LBS | BODY MASS INDEX: 26.36 KG/M2 | SYSTOLIC BLOOD PRESSURE: 112 MMHG | TEMPERATURE: 98.1 F | RESPIRATION RATE: 16 BRPM | HEART RATE: 62 BPM | OXYGEN SATURATION: 100 %

## 2023-05-05 DIAGNOSIS — I10 RESISTANT HYPERTENSION: ICD-10-CM

## 2023-05-05 DIAGNOSIS — E11.29 TYPE 2 DIABETES MELLITUS WITH OTHER KIDNEY COMPLICATION, UNSPECIFIED WHETHER LONG TERM INSULIN USE (HCC): ICD-10-CM

## 2023-05-05 DIAGNOSIS — I12.9 BENIGN HYPERTENSIVE KIDNEY DISEASE WITH CHRONIC KIDNEY DISEASE STAGE I THROUGH STAGE IV, OR UNSPECIFIED: Primary | ICD-10-CM

## 2023-05-05 PROBLEM — F51.04 CHRONIC INSOMNIA: Status: ACTIVE | Noted: 2021-05-27

## 2023-05-05 PROCEDURE — 1123F ACP DISCUSS/DSCN MKR DOCD: CPT | Performed by: FAMILY MEDICINE

## 2023-05-05 PROCEDURE — 3044F HG A1C LEVEL LT 7.0%: CPT | Performed by: FAMILY MEDICINE

## 2023-05-05 PROCEDURE — 99214 OFFICE O/P EST MOD 30 MIN: CPT | Performed by: FAMILY MEDICINE

## 2023-05-05 SDOH — ECONOMIC STABILITY: INCOME INSECURITY: HOW HARD IS IT FOR YOU TO PAY FOR THE VERY BASICS LIKE FOOD, HOUSING, MEDICAL CARE, AND HEATING?: NOT VERY HARD

## 2023-05-05 SDOH — ECONOMIC STABILITY: FOOD INSECURITY: WITHIN THE PAST 12 MONTHS, YOU WORRIED THAT YOUR FOOD WOULD RUN OUT BEFORE YOU GOT MONEY TO BUY MORE.: NEVER TRUE

## 2023-05-05 SDOH — ECONOMIC STABILITY: HOUSING INSECURITY
IN THE LAST 12 MONTHS, WAS THERE A TIME WHEN YOU DID NOT HAVE A STEADY PLACE TO SLEEP OR SLEPT IN A SHELTER (INCLUDING NOW)?: NO

## 2023-05-05 SDOH — ECONOMIC STABILITY: FOOD INSECURITY: WITHIN THE PAST 12 MONTHS, THE FOOD YOU BOUGHT JUST DIDN'T LAST AND YOU DIDN'T HAVE MONEY TO GET MORE.: NEVER TRUE

## 2023-05-05 ASSESSMENT — PATIENT HEALTH QUESTIONNAIRE - PHQ9
SUM OF ALL RESPONSES TO PHQ QUESTIONS 1-9: 0
SUM OF ALL RESPONSES TO PHQ9 QUESTIONS 1 & 2: 0
2. FEELING DOWN, DEPRESSED OR HOPELESS: 0
1. LITTLE INTEREST OR PLEASURE IN DOING THINGS: 0

## 2023-05-05 ASSESSMENT — ENCOUNTER SYMPTOMS
COUGH: 0
ABDOMINAL PAIN: 0
NAUSEA: 0
CONSTIPATION: 0
VOMITING: 0
WHEEZING: 0
DIARRHEA: 0
SHORTNESS OF BREATH: 0

## 2023-05-05 NOTE — PROGRESS NOTES
[facility]  DATE OF VISIT : 2023    Patient : Phillip Covington   Age : 80 y.o.  : 1935   MRN : [id number]   ______________________________________________________________________    Chief Complaint :   Chief Complaint   Patient presents with    Follow-up     Patient presents today for a follow up appt on DM. HPI : Phillip Covington is 80 y.o. female who presented to the clinic today for office visit. HTN: Nifedipine 30 mg twice daily, Coreg 25 mg daily. Has upcoming appt on  with Nephro. Stage 3 CKD: following with nephro. NIDDM: Last A1c 23 6.3. Patient currently not on any medications. I reviewed the patient's past medications, allergies and past medical history during this visit. Past Medical History :    Past Medical History:   Diagnosis Date    Hypertension     pt states controlled    Left cataract     Type 2 diabetes mellitus with renal manifestations ([facility] [address] 2015     Past Surgical History:   Procedure Laterality Date    CATARACT REMOVAL WITH IMPLANT Bilateral     CHOLECYSTECTOMY      COLONOSCOPY      HYSTERECTOMY (CERVIX STATUS UNKNOWN)         Social History :  Social History       Tobacco History       Smoking Status  Former Smoking Tobacco Type  Cigarettes      Smokeless Tobacco Use  Never      Tobacco Comments  quit more than 30 years ago smoking              Alcohol History       Alcohol Use Status  Yes Comment  occasionally              Drug Use       Drug Use Status  No              Sexual Activity       Sexually Active  Not Currently                     Allergies :    Allergies   Allergen Reactions    Barbiturates Rash and Other (See Comments)     On limbs  On limbs      Hydralazine Rash    Benzodiazepines     Ethanol     Valsartan Other (See Comments)    Phenobarbital Rash    Valium [Diazepam] Rash       Medication List :    Current Outpatient Medications   Medication Sig Dispense Refill    carvedilol (COREG) 25

## 2023-05-31 ENCOUNTER — HOSPITAL ENCOUNTER (OUTPATIENT)
Age: 88
Discharge: HOME | End: 2023-05-31
Payer: MEDICARE

## 2023-05-31 LAB
ALBUMIN SERPL-MCNC: 4.1 G/DL (ref 3.5–5.2)
ALP SERPL-CCNC: 81 U/L (ref 35–104)
ALT SERPL-CCNC: 18 U/L (ref 0–32)
ANION GAP SERPL CALCULATED.3IONS-SCNC: 14 MMOL/L (ref 7–16)
AST SERPL-CCNC: 27 U/L (ref 0–31)
BACTERIA URNS QL MICRO: NORMAL /HPF
BASOPHILS # BLD: 0.04 E9/L (ref 0–0.2)
BASOPHILS NFR BLD: 0.7 % (ref 0–2)
BILIRUB SERPL-MCNC: 0.6 MG/DL (ref 0–1.2)
BILIRUB UR QL STRIP: NEGATIVE
BUN SERPL-MCNC: 21 MG/DL (ref 6–23)
CALCIUM SERPL-MCNC: 9.7 MG/DL (ref 8.6–10.2)
CHLORIDE SERPL-SCNC: 105 MMOL/L (ref 98–107)
CLARITY UR: CLEAR
CO2 SERPL-SCNC: 24 MMOL/L (ref 22–29)
COLOR UR: YELLOW
CREAT SERPL-MCNC: 1.3 MG/DL (ref 0.5–1)
CREAT UR-MCNC: 110 MG/DL (ref 29–226)
EOSINOPHIL # BLD: 0.1 E9/L (ref 0.05–0.5)
EOSINOPHIL NFR BLD: 1.8 % (ref 0–6)
ERYTHROCYTE [DISTWIDTH] IN BLOOD BY AUTOMATED COUNT: 13.4 FL (ref 11.5–15)
GLUCOSE SERPL-MCNC: 119 MG/DL (ref 74–99)
GLUCOSE UR STRIP-MCNC: NEGATIVE MG/DL
HCT VFR BLD AUTO: 42.2 % (ref 34–48)
HGB BLD-MCNC: 13.5 G/DL (ref 11.5–15.5)
HGB UR QL STRIP: NEGATIVE
IMM GRANULOCYTES # BLD: 0.01 E9/L
IMM GRANULOCYTES NFR BLD: 0.2 % (ref 0–5)
KETONES UR STRIP-MCNC: NEGATIVE MG/DL
LEUKOCYTE ESTERASE UR QL STRIP: NEGATIVE
LYMPHOCYTES # BLD: 2.64 E9/L (ref 1.5–4)
LYMPHOCYTES NFR BLD: 48.4 % (ref 20–42)
MAGNESIUM SERPL-MCNC: 2.2 MG/DL (ref 1.6–2.6)
MCH RBC QN AUTO: 32.4 PG (ref 26–35)
MCHC RBC AUTO-ENTMCNC: 32 % (ref 32–34.5)
MCV RBC AUTO: 101.2 FL (ref 80–99.9)
MICROALBUMIN UR-MCNC: <12 MG/L
MICROALBUMIN/CREAT UR-RTO: ABNORMAL (ref 0–30)
MONOCYTES # BLD: 0.56 E9/L (ref 0.1–0.95)
MONOCYTES NFR BLD: 10.3 % (ref 2–12)
NEUTROPHILS # BLD: 2.1 E9/L (ref 1.8–7.3)
NEUTS SEG NFR BLD: 38.6 % (ref 43–80)
NITRITE UR QL STRIP: NEGATIVE
PH UR STRIP: 6 [PH] (ref 5–9)
PHOSPHATE SERPL-MCNC: 3.3 MG/DL (ref 2.5–4.5)
PLATELET # BLD AUTO: 255 E9/L (ref 130–450)
PMV BLD AUTO: 8.6 FL (ref 7–12)
POTASSIUM SERPL-SCNC: 4 MMOL/L (ref 3.5–5)
PROT SERPL-MCNC: 7 G/DL (ref 6.4–8.3)
PROT UR STRIP-MCNC: NEGATIVE MG/DL
PROT UR-MCNC: 7 MG/DL (ref 0–12)
PROTEIN/CREAT RATIO: 0.1
PROTEIN/CREAT RATIO: 0.1 (ref 0–0.2)
PTH-INTACT SERPL-MCNC: 40 PG/ML (ref 15–65)
RBC # BLD AUTO: 4.17 E12/L (ref 3.5–5.5)
RBC #/AREA URNS HPF: NORMAL /HPF (ref 0–2)
SODIUM SERPL-SCNC: 143 MMOL/L (ref 132–146)
SP GR UR STRIP: 1.02 (ref 1–1.03)
URATE SERPL-MCNC: 7.4 MG/DL (ref 2.4–5.7)
UROBILINOGEN UR STRIP-ACNC: 0.2 E.U./DL
VITAMIN D 25-HYDROXY: 50 NG/ML (ref 30–100)
WBC # BLD: 5.5 E9/L (ref 4.5–11.5)
WBC #/AREA URNS HPF: NORMAL /HPF (ref 0–5)

## 2023-05-31 PROCEDURE — 36415 COLL VENOUS BLD VENIPUNCTURE: CPT

## 2023-05-31 PROCEDURE — 84550 ASSAY OF BLOOD/URIC ACID: CPT

## 2023-05-31 PROCEDURE — 82570 ASSAY OF URINE CREATININE: CPT

## 2023-05-31 PROCEDURE — 81001 URINALYSIS AUTO W/SCOPE: CPT

## 2023-05-31 PROCEDURE — 80053 COMPREHEN METABOLIC PANEL: CPT

## 2023-05-31 PROCEDURE — 83970 ASSAY OF PARATHORMONE: CPT

## 2023-05-31 PROCEDURE — 85025 COMPLETE CBC W/AUTO DIFF WBC: CPT

## 2023-05-31 PROCEDURE — 82044 UR ALBUMIN SEMIQUANTITATIVE: CPT

## 2023-05-31 PROCEDURE — 82306 VITAMIN D 25 HYDROXY: CPT

## 2023-05-31 PROCEDURE — 84100 ASSAY OF PHOSPHORUS: CPT

## 2023-05-31 PROCEDURE — 83735 ASSAY OF MAGNESIUM: CPT

## 2023-05-31 PROCEDURE — 84156 ASSAY OF PROTEIN URINE: CPT

## 2023-06-19 ENCOUNTER — CLINICAL DOCUMENTATION (OUTPATIENT)
Dept: SPIRITUAL SERVICES | Age: 88
End: 2023-06-19

## 2023-06-22 NOTE — ACP (ADVANCE CARE PLANNING)
Advance Care Planning   Ambulatory ACP Specialist Patient Outreach    Date:  6/19/2023  ACP Specialist:  Nabeel Henriquez    Outreach call to patient in follow-up to ACP Specialist referral from: Fernie Roberts MD    [x] PCP  [] Provider   [] Ambulatory Care Management [] Other for Reason:    [x] Advance Directive Assistance  [] Code Status Discussion  [] Complete Portable DNR Order  [] Discuss Goals of Care  [] Complete POST/MOST  [] Early ACP Decision-Making  [] Other    Date Referral Received:6/16/23    Today's Outreach:  [x] First   [] Second  [] Third                               Third outreach made by []  phone  [] email []   [employer]     Intervention:  [x] Spoke with Patient  [] Left VM requesting return call      Outcome: I spoke with Lana Falling and schedule for 6/22 @ 10:00. Next Step:   [x] ACP scheduled conversation  [] Outreach again in one week               [] Email / Mail ACP Info Sheets  [] Email / Mail Advance Directive            [] Close Referral. Routing closure to referring provider/staff and to ACP Specialist . [] Closure Letter mailed to Patient with Invitation to Contact ACP Specialist if/when ready.     Thank you for this referral.
of advance directive given to staff to scan into medical record. Routed ACP note to attending provider or other IDT member.     Patient / Healthcare Decision Maker Instructions:  Review completed ACP document(s) and update, if needed, with changes health or future preferences    Electronically signed by Caroline Ruiz [facility] on 6/22/2023 at 1:00 PM.

## 2023-07-23 ENCOUNTER — APPOINTMENT (OUTPATIENT)
Dept: CT IMAGING | Age: 88
End: 2023-07-23
Attending: STUDENT IN AN ORGANIZED HEALTH CARE EDUCATION/TRAINING PROGRAM
Payer: MEDICARE

## 2023-07-23 ENCOUNTER — HOSPITAL ENCOUNTER (EMERGENCY)
Age: 88
Discharge: HOME | End: 2023-07-23
Attending: STUDENT IN AN ORGANIZED HEALTH CARE EDUCATION/TRAINING PROGRAM
Payer: MEDICARE

## 2023-07-23 VITALS
RESPIRATION RATE: 16 BRPM | HEART RATE: 69 BPM | DIASTOLIC BLOOD PRESSURE: 78 MMHG | SYSTOLIC BLOOD PRESSURE: 140 MMHG | OXYGEN SATURATION: 99 % | TEMPERATURE: 98.8 F

## 2023-07-23 DIAGNOSIS — N39.0 URINARY TRACT INFECTION WITHOUT HEMATURIA, SITE UNSPECIFIED: Primary | ICD-10-CM

## 2023-07-23 DIAGNOSIS — R19.00 PELVIC MASS: ICD-10-CM

## 2023-07-23 LAB
ALBUMIN SERPL-MCNC: 3.9 G/DL (ref 3.5–5.2)
ALP SERPL-CCNC: 76 U/L (ref 35–104)
ALT SERPL-CCNC: 19 U/L (ref 0–32)
ANION GAP SERPL CALCULATED.3IONS-SCNC: 14 MMOL/L (ref 7–16)
AST SERPL-CCNC: 26 U/L (ref 0–31)
BACTERIA URNS QL MICRO: ABNORMAL
BASOPHILS # BLD: 0.05 K/UL (ref 0–0.2)
BASOPHILS NFR BLD: 1 % (ref 0–2)
BILIRUB SERPL-MCNC: 0.5 MG/DL (ref 0–1.2)
BILIRUB UR QL STRIP: ABNORMAL
BUN SERPL-MCNC: 14 MG/DL (ref 6–23)
CALCIUM SERPL-MCNC: 10.2 MG/DL (ref 8.6–10.2)
CHLORIDE SERPL-SCNC: 101 MMOL/L (ref 98–107)
CLARITY UR: CLEAR
CO2 SERPL-SCNC: 23 MMOL/L (ref 22–29)
COLOR UR: YELLOW
CREAT SERPL-MCNC: 1.3 MG/DL (ref 0.5–1)
EOSINOPHIL # BLD: 0.1 K/UL (ref 0.05–0.5)
EOSINOPHILS RELATIVE PERCENT: 2 % (ref 0–6)
ERYTHROCYTE [DISTWIDTH] IN BLOOD BY AUTOMATED COUNT: 13.2 % (ref 11.5–15)
GFR SERPL CREATININE-BSD FRML MDRD: 41 ML/MIN/1.73M2
GLUCOSE SERPL-MCNC: 110 MG/DL (ref 74–99)
GLUCOSE UR STRIP-MCNC: 100 MG/DL
HCT VFR BLD AUTO: 40.3 % (ref 34–48)
HGB BLD-MCNC: 13.5 G/DL (ref 11.5–15.5)
HGB UR QL STRIP.AUTO: NEGATIVE
IMM GRANULOCYTES # BLD AUTO: <0.03 K/UL (ref 0–0.58)
IMM GRANULOCYTES NFR BLD: 0 % (ref 0–5)
KETONES UR STRIP-MCNC: ABNORMAL MG/DL
LACTATE BLDV-SCNC: 1.2 MMOL/L (ref 0.5–2.2)
LEUKOCYTE ESTERASE UR QL STRIP: ABNORMAL
LYMPHOCYTES NFR BLD: 2.1 K/UL (ref 1.5–4)
LYMPHOCYTES RELATIVE PERCENT: 33 % (ref 20–42)
MCH RBC QN AUTO: 31.8 PG (ref 26–35)
MCHC RBC AUTO-ENTMCNC: 33.5 G/DL (ref 32–34.5)
MCV RBC AUTO: 95 FL (ref 80–99.9)
MONOCYTES NFR BLD: 0.79 K/UL (ref 0.1–0.95)
MONOCYTES NFR BLD: 13 % (ref 2–12)
NEUTROPHILS NFR BLD: 51 % (ref 43–80)
NEUTS SEG NFR BLD: 3.23 K/UL (ref 1.8–7.3)
NITRITE UR QL STRIP: NEGATIVE
PH UR STRIP: 5.5 [PH] (ref 5–9)
PLATELET # BLD AUTO: 378 K/UL (ref 130–450)
PMV BLD AUTO: 8.4 FL (ref 7–12)
POTASSIUM SERPL-SCNC: 3.8 MMOL/L (ref 3.5–5)
PROT SERPL-MCNC: 7.5 G/DL (ref 6.4–8.3)
PROT UR STRIP-MCNC: 30 MG/DL
RBC # BLD AUTO: 4.24 M/UL (ref 3.5–5.5)
RBC #/AREA URNS HPF: ABNORMAL /HPF
SODIUM SERPL-SCNC: 138 MMOL/L (ref 132–146)
SP GR UR STRIP: >1.03 (ref 1–1.03)
UROBILINOGEN UR STRIP-ACNC: 0.2 EU/DL (ref 0–1)
WBC #/AREA URNS HPF: ABNORMAL /HPF
WBC OTHER # BLD: 6.3 K/UL (ref 4.5–11.5)

## 2023-07-23 PROCEDURE — 99285 EMERGENCY DEPT VISIT HI MDM: CPT

## 2023-07-23 PROCEDURE — 87086 URINE CULTURE/COLONY COUNT: CPT

## 2023-07-23 PROCEDURE — 85027 COMPLETE CBC AUTOMATED: CPT

## 2023-07-23 PROCEDURE — 6360000004 HC RX CONTRAST MEDICATION: Performed by: RADIOLOGY

## 2023-07-23 PROCEDURE — 81001 URINALYSIS AUTO W/SCOPE: CPT

## 2023-07-23 PROCEDURE — 2580000003 HC RX 258: Performed by: RADIOLOGY

## 2023-07-23 PROCEDURE — 6370000000 HC RX 637 (ALT 250 FOR IP): Performed by: EMERGENCY MEDICINE

## 2023-07-23 PROCEDURE — 80053 COMPREHEN METABOLIC PANEL: CPT

## 2023-07-23 PROCEDURE — 74177 CT ABD & PELVIS W/CONTRAST: CPT

## 2023-07-23 PROCEDURE — 83605 ASSAY OF LACTIC ACID: CPT

## 2023-07-23 RX ORDER — CEFDINIR 300 MG/1
300 CAPSULE ORAL 2 TIMES DAILY
Qty: 14 CAPSULE | Refills: 0 | Status: SHIPPED | OUTPATIENT
Start: 2023-07-23 | End: 2023-07-30

## 2023-07-23 RX ORDER — SODIUM CHLORIDE 0.9 % (FLUSH) 0.9 %
10 SYRINGE (ML) INJECTION ONCE
Status: COMPLETED | OUTPATIENT
Start: 2023-07-23 | End: 2023-07-23

## 2023-07-23 RX ORDER — CEFDINIR 300 MG/1
300 CAPSULE ORAL ONCE
Status: COMPLETED | OUTPATIENT
Start: 2023-07-23 | End: 2023-07-23

## 2023-07-23 RX ADMIN — IOPAMIDOL 75 ML: 755 INJECTION, SOLUTION INTRAVENOUS at 15:35

## 2023-07-23 RX ADMIN — Medication 10 ML: at 15:35

## 2023-07-23 RX ADMIN — CEFDINIR 300 MG: 300 CAPSULE ORAL at 18:05

## 2023-07-23 ASSESSMENT — PAIN DESCRIPTION - LOCATION: LOCATION: ABDOMEN

## 2023-07-23 ASSESSMENT — PAIN SCALES - GENERAL: PAINLEVEL_OUTOF10: 2

## 2023-07-23 ASSESSMENT — PAIN - FUNCTIONAL ASSESSMENT: PAIN_FUNCTIONAL_ASSESSMENT: 0-10

## 2023-07-23 NOTE — ED PROVIDER NOTES
[address] ENCOUNTER        Pt Name: Mckenzie Jarrell  MRN: [id number]  [address] [facility] 1935  Date of evaluation: 7/23/2023  Provider: Deon Biu DO  PCP: Albert Cantu MD  Note Started: 12:57 PM EDT 7/23/23    CHIEF COMPLAINT       Chief Complaint   Patient presents with    Abdominal Pain     No BM in 2 weeks, have not been eating or drinking well past few days. Mild generalized abdominal pain. HISTORY OF PRESENT ILLNESS: 1 or more Elements   History From: patient    Limitations to history : None    Mckenzie Jarrell is a 80 y.o. female who presents with concern for diarrhea and lower abdominal fullness over the past 2 weeks. She says is not particularly pain, sometimes there will be bright red blood in the diarrhea, no dysuria, hematuria, fever, chills, nausea, vomiting, chest pain or difficulty breathing. This is never happened to her in the past.  She is not having any bleeding in between going to the bathroom at all. No other associated complaints. Nursing Notes were all reviewed and agreed with or any disagreements were addressed in the HPI. REVIEW OF SYSTEMS :      Positives and Pertinent negatives as per HPI. SURGICAL HISTORY     Past Surgical History:   Procedure Laterality Date    CATARACT REMOVAL WITH IMPLANT Bilateral     CHOLECYSTECTOMY      COLONOSCOPY      HYSTERECTOMY (CERVIX STATUS UNKNOWN)         CURRENTMEDICATIONS       Previous Medications    CARVEDILOL (COREG) 25 MG TABLET    TAKE ONE TABLET BY MOUTH TWICE A DAY WITH MEALS    CHLORTHALIDONE (HYGROTON) 25 MG TABLET    Take 0.5 tablets by mouth daily    FLUTICASONE (FLONASE) 50 MCG/ACT NASAL SPRAY    USE AS NEEDED    NIFEDIPINE (ADALAT CC) 30 MG EXTENDED RELEASE TABLET    Take 1 tablet by mouth 2 times daily    POLYETHYLENE GLYCOL (MIRALAX) POWDER    Take 17 g daily for the next 10 days.  Decreased to one half capful once daily once

## 2023-07-24 LAB
MICROORGANISM SPEC CULT: ABNORMAL
MICROORGANISM SPEC CULT: ABNORMAL
SPECIMEN DESCRIPTION: ABNORMAL

## 2023-07-28 ENCOUNTER — OFFICE VISIT (OUTPATIENT)
Age: 88
End: 2023-07-28

## 2023-07-28 VITALS
WEIGHT: 147 LBS | RESPIRATION RATE: 16 BRPM | BODY MASS INDEX: 23.07 KG/M2 | HEIGHT: 67 IN | HEART RATE: 89 BPM | OXYGEN SATURATION: 97 % | DIASTOLIC BLOOD PRESSURE: 74 MMHG | TEMPERATURE: 96.8 F | SYSTOLIC BLOOD PRESSURE: 124 MMHG

## 2023-07-28 DIAGNOSIS — K62.5 BRBPR (BRIGHT RED BLOOD PER RECTUM): ICD-10-CM

## 2023-07-28 DIAGNOSIS — K92.1 MELENA: Primary | ICD-10-CM

## 2023-07-28 ASSESSMENT — ENCOUNTER SYMPTOMS
ANAL BLEEDING: 1
DIARRHEA: 1
BLOOD IN STOOL: 1
COUGH: 0
ABDOMINAL DISTENTION: 0
CONSTIPATION: 0
NAUSEA: 0
ABDOMINAL PAIN: 0
VOMITING: 0
SHORTNESS OF BREATH: 0
WHEEZING: 0

## 2023-07-28 NOTE — PROGRESS NOTES
[facility]  DATE OF VISIT : 2023    Patient : Lorraine Wilkinson   Age : 80 y.o.  : 1935   MRN : [id number]   ______________________________________________________________________    Chief Complaint :   Chief Complaint   Patient presents with    Diarrhea     Patient is here today with concerns of diarrhea. States that they started yesterday. Has been taking medications for a UTI she was diagnosed with recently at the ER. HPI : Lorraine Wilkinson is 80 y.o. female who presented to the clinic today for office visit. Has been having rectal bleeding with melena. Started about 3 weeks ago. Initially she started having constipation, she was started taking MiraLAX but shortly after developed diarrhea followed by melena. Now she is having episodes of incontinence with blood and states all her stools are very dark and watery. Denies any nausea, vomiting or abdominal pain. I reviewed the patient's past medications, allergies and past medical history during this visit. Past Medical History :      Past Medical History:   Diagnosis Date    Hypertension     pt states controlled    Left cataract     Type 2 diabetes mellitus with renal manifestations ([address]) 2015     Past Surgical History:   Procedure Laterality Date    CATARACT REMOVAL WITH IMPLANT Bilateral     CHOLECYSTECTOMY      COLONOSCOPY      HYSTERECTOMY (CERVIX STATUS UNKNOWN)         Social History :  Social History       Tobacco History       Smoking Status  Former Smoking Tobacco Type  Cigarettes      Smokeless Tobacco Use  Never      Tobacco Comments  quit more than 30 years ago smoking              Alcohol History       Alcohol Use Status  Yes Comment  occasionally              Drug Use       Drug Use Status  No              Sexual Activity       Sexually Active  Not Currently                     Allergies :    Allergies   Allergen Reactions    Barbiturates Rash and Other (See Comments)     On limbs  On

## 2023-08-16 ENCOUNTER — HOSPITAL ENCOUNTER (EMERGENCY)
Age: 88
Discharge: HOME | End: 2023-08-16
Attending: STUDENT IN AN ORGANIZED HEALTH CARE EDUCATION/TRAINING PROGRAM
Payer: MEDICARE

## 2023-08-16 VITALS
RESPIRATION RATE: 17 BRPM | OXYGEN SATURATION: 98 % | HEART RATE: 62 BPM | BODY MASS INDEX: 22.29 KG/M2 | HEIGHT: 67 IN | DIASTOLIC BLOOD PRESSURE: 88 MMHG | SYSTOLIC BLOOD PRESSURE: 198 MMHG | TEMPERATURE: 97.6 F | WEIGHT: 142 LBS

## 2023-08-16 DIAGNOSIS — R19.7 DIARRHEA, UNSPECIFIED TYPE: Primary | ICD-10-CM

## 2023-08-16 LAB
ALBUMIN SERPL-MCNC: 3.6 G/DL (ref 3.5–5.2)
ALBUMIN SERPL-MCNC: NORMAL G/DL (ref 3.5–5.2)
ALBUMIN/GLOB SERPL: NORMAL {RATIO} (ref 1–2.5)
ALP SERPL-CCNC: 52 U/L (ref 35–104)
ALP SERPL-CCNC: NORMAL U/L (ref 35–104)
ALT SERPL-CCNC: 17 U/L (ref 0–32)
ALT SERPL-CCNC: NORMAL U/L (ref 5–33)
ANION GAP SERPL CALCULATED.3IONS-SCNC: 10 MMOL/L (ref 7–16)
ANION GAP SERPL CALCULATED.3IONS-SCNC: NORMAL MMOL/L (ref 9–17)
AST SERPL-CCNC: 56 U/L (ref 0–31)
AST SERPL-CCNC: NORMAL U/L
BACTERIA URNS QL MICRO: ABNORMAL
BASOPHILS # BLD: 0.06 K/UL (ref 0–0.2)
BASOPHILS NFR BLD: 1 % (ref 0–2)
BILIRUB SERPL-MCNC: 0.6 MG/DL (ref 0–1.2)
BILIRUB SERPL-MCNC: NORMAL MG/DL (ref 0.3–1.2)
BILIRUB UR QL STRIP: NEGATIVE
BUN SERPL-MCNC: 11 MG/DL (ref 6–23)
BUN SERPL-MCNC: NORMAL MG/DL (ref 8–23)
BUN/CREAT SERPL: NORMAL (ref 9–20)
CALCIUM SERPL-MCNC: 9.1 MG/DL (ref 8.6–10.2)
CALCIUM SERPL-MCNC: NORMAL MG/DL (ref 8.6–10.4)
CHLORIDE SERPL-SCNC: 108 MMOL/L (ref 98–107)
CHLORIDE SERPL-SCNC: NORMAL MMOL/L (ref 98–107)
CLARITY UR: CLEAR
CO2 SERPL-SCNC: 22 MMOL/L (ref 22–29)
CO2 SERPL-SCNC: NORMAL MMOL/L (ref 20–31)
COLOR UR: YELLOW
CREAT SERPL-MCNC: 1.1 MG/DL (ref 0.5–1)
CREAT SERPL-MCNC: NORMAL MG/DL (ref 0.5–0.9)
EOSINOPHIL # BLD: 0.07 K/UL (ref 0.05–0.5)
EOSINOPHILS RELATIVE PERCENT: 1 % (ref 0–6)
EPI CELLS #/AREA URNS HPF: ABNORMAL /HPF
ERYTHROCYTE [DISTWIDTH] IN BLOOD BY AUTOMATED COUNT: 14.3 % (ref 11.5–15)
GFR SERPL CREATININE-BSD FRML MDRD: 50 ML/MIN/1.73M2
GFR SERPL CREATININE-BSD FRML MDRD: NORMAL ML/MIN/1.73M2
GLUCOSE SERPL-MCNC: 94 MG/DL (ref 74–99)
GLUCOSE SERPL-MCNC: NORMAL MG/DL (ref 70–99)
GLUCOSE UR STRIP-MCNC: NEGATIVE MG/DL
HCT VFR BLD AUTO: 40.8 % (ref 34–48)
HGB BLD-MCNC: 12.6 G/DL (ref 11.5–15.5)
HGB UR QL STRIP.AUTO: NEGATIVE
IMM GRANULOCYTES # BLD AUTO: <0.03 K/UL (ref 0–0.58)
IMM GRANULOCYTES NFR BLD: 0 % (ref 0–5)
KETONES UR STRIP-MCNC: ABNORMAL MG/DL
LACTATE BLDV-SCNC: 2.2 MMOL/L (ref 0.5–2.2)
LEUKOCYTE ESTERASE UR QL STRIP: NEGATIVE
LIPASE SERPL-CCNC: 31 U/L (ref 13–60)
LYMPHOCYTES NFR BLD: 2.58 K/UL (ref 1.5–4)
LYMPHOCYTES RELATIVE PERCENT: 45 % (ref 20–42)
MCH RBC QN AUTO: 31.5 PG (ref 26–35)
MCHC RBC AUTO-ENTMCNC: 30.9 G/DL (ref 32–34.5)
MCV RBC AUTO: 102 FL (ref 80–99.9)
MONOCYTES NFR BLD: 0.71 K/UL (ref 0.1–0.95)
MONOCYTES NFR BLD: 12 % (ref 2–12)
MUCOUS THREADS URNS QL MICRO: PRESENT
NEUTROPHILS NFR BLD: 40 % (ref 43–80)
NEUTS SEG NFR BLD: 2.31 K/UL (ref 1.8–7.3)
NITRITE UR QL STRIP: NEGATIVE
PH UR STRIP: 6 [PH] (ref 5–9)
PLATELET # BLD AUTO: 287 K/UL (ref 130–450)
PMV BLD AUTO: 8.9 FL (ref 7–12)
POTASSIUM SERPL-SCNC: 5.1 MMOL/L (ref 3.5–5)
POTASSIUM SERPL-SCNC: NORMAL MMOL/L (ref 3.7–5.3)
PROT SERPL-MCNC: 6.8 G/DL (ref 6.4–8.3)
PROT SERPL-MCNC: NORMAL G/DL (ref 6.4–8.3)
PROT UR STRIP-MCNC: ABNORMAL MG/DL
RBC # BLD AUTO: 4 M/UL (ref 3.5–5.5)
RBC #/AREA URNS HPF: ABNORMAL /HPF
REASON FOR REJECTION: NORMAL
REASON FOR REJECTION: NORMAL
SODIUM SERPL-SCNC: 140 MMOL/L (ref 132–146)
SODIUM SERPL-SCNC: NORMAL MMOL/L (ref 135–144)
SP GR UR STRIP: 1.02 (ref 1–1.03)
SPECIMEN SOURCE: NORMAL
SPECIMEN SOURCE: NORMAL
TROPONIN I SERPL HS-MCNC: 12 NG/L (ref 0–9)
TROPONIN I SERPL HS-MCNC: NORMAL NG/L (ref 0–14)
TROPONIN INTERP: NORMAL
TROPONIN T SERPL-MCNC: NORMAL NG/ML
UROBILINOGEN UR STRIP-ACNC: 0.2 EU/DL (ref 0–1)
WBC #/AREA URNS HPF: ABNORMAL /HPF
WBC OTHER # BLD: 5.7 K/UL (ref 4.5–11.5)
ZZ NTE CLEAN UP: ORDERED TEST: NORMAL
ZZ NTE CLEAN UP: ORDERED TEST: NORMAL

## 2023-08-16 PROCEDURE — 87449 NOS EACH ORGANISM AG IA: CPT

## 2023-08-16 PROCEDURE — 83690 ASSAY OF LIPASE: CPT

## 2023-08-16 PROCEDURE — 99284 EMERGENCY DEPT VISIT MOD MDM: CPT

## 2023-08-16 PROCEDURE — 87493 C DIFF AMPLIFIED PROBE: CPT

## 2023-08-16 PROCEDURE — 87324 CLOSTRIDIUM AG IA: CPT

## 2023-08-16 PROCEDURE — 93005 ELECTROCARDIOGRAM TRACING: CPT | Performed by: STUDENT IN AN ORGANIZED HEALTH CARE EDUCATION/TRAINING PROGRAM

## 2023-08-16 PROCEDURE — 82270 OCCULT BLOOD FECES: CPT

## 2023-08-16 PROCEDURE — 87046 STOOL CULTR AEROBIC BACT EA: CPT

## 2023-08-16 PROCEDURE — 87045 FECES CULTURE AEROBIC BACT: CPT

## 2023-08-16 PROCEDURE — 2580000003 HC RX 258: Performed by: STUDENT IN AN ORGANIZED HEALTH CARE EDUCATION/TRAINING PROGRAM

## 2023-08-16 PROCEDURE — 87425 ROTAVIRUS AG IA: CPT

## 2023-08-16 PROCEDURE — 87427 SHIGA-LIKE TOXIN AG IA: CPT

## 2023-08-16 PROCEDURE — 85025 COMPLETE CBC W/AUTO DIFF WBC: CPT

## 2023-08-16 PROCEDURE — 87329 GIARDIA AG IA: CPT

## 2023-08-16 PROCEDURE — 84484 ASSAY OF TROPONIN QUANT: CPT

## 2023-08-16 PROCEDURE — 87086 URINE CULTURE/COLONY COUNT: CPT

## 2023-08-16 PROCEDURE — 82705 FATS/LIPIDS FECES QUAL: CPT

## 2023-08-16 PROCEDURE — 36415 COLL VENOUS BLD VENIPUNCTURE: CPT

## 2023-08-16 PROCEDURE — 80053 COMPREHEN METABOLIC PANEL: CPT

## 2023-08-16 PROCEDURE — 83605 ASSAY OF LACTIC ACID: CPT

## 2023-08-16 PROCEDURE — 81001 URINALYSIS AUTO W/SCOPE: CPT

## 2023-08-16 RX ORDER — 0.9 % SODIUM CHLORIDE 0.9 %
1000 INTRAVENOUS SOLUTION INTRAVENOUS ONCE
Status: COMPLETED | OUTPATIENT
Start: 2023-08-16 | End: 2023-08-16

## 2023-08-16 RX ADMIN — SODIUM CHLORIDE 1000 ML: 9 INJECTION, SOLUTION INTRAVENOUS at 15:34

## 2023-08-16 ASSESSMENT — LIFESTYLE VARIABLES
HOW OFTEN DO YOU HAVE A DRINK CONTAINING ALCOHOL: NEVER
HOW MANY STANDARD DRINKS CONTAINING ALCOHOL DO YOU HAVE ON A TYPICAL DAY: PATIENT DOES NOT DRINK

## 2023-08-16 NOTE — ED PROVIDER NOTES
[address] ENCOUNTER        Pt Name: Ihsan Rodriguez  MRN: [id number]  [address] [facility] 1935  Date of evaluation: 8/16/2023  Provider: Katherine Calles DO  PCP: Raj Nelson MD  Note Started: 2:54 PM EDT 8/16/23    CHIEF COMPLAINT       Chief Complaint   Patient presents with    Diarrhea     Off and on for 2 weeks. Denies abdominal pain. HISTORY OF PRESENT ILLNESS: 1 or more Elements   History From: ***    {Limitations to history (Optional):08679}    Ihsan Rodriguez is a 80 y.o. female who presents ***    Nursing Notes were all reviewed and agreed with or any disagreements were addressed in the HPI. REVIEW OF SYSTEMS :      Review of Systems    Positives and Pertinent negatives as per HPI. SURGICAL HISTORY     Past Surgical History:   Procedure Laterality Date    CATARACT REMOVAL WITH IMPLANT Bilateral     CHOLECYSTECTOMY      COLONOSCOPY      HYSTERECTOMY (CERVIX STATUS UNKNOWN)         CURRENTMEDICATIONS       Previous Medications    CARVEDILOL (COREG) 25 MG TABLET    TAKE ONE TABLET BY MOUTH TWICE A DAY WITH MEALS    CHLORTHALIDONE (HYGROTON) 25 MG TABLET    Take 0.5 tablets by mouth daily    FLUTICASONE (FLONASE) 50 MCG/ACT NASAL SPRAY    USE AS NEEDED    NIFEDIPINE (ADALAT CC) 30 MG EXTENDED RELEASE TABLET    Take 1 tablet by mouth 2 times daily    POLYETHYLENE GLYCOL (MIRALAX) POWDER    Take 17 g daily for the next 10 days. Decreased to one half capful once daily once having soft stools.     TOBRAMYCIN-DEXAMETHASONE (TOBRADEX) 0.3-0.1 % OPHTHALMIC SUSPENSION    INSTILL ONE DROP INTO THE RIGHT EYE FOUR TIMES A DAY       ALLERGIES     Barbiturates, Hydralazine, Benzodiazepines, Ethanol, Valsartan, Phenobarbital, and Valium [diazepam]    FAMILYHISTORY       Family History   Problem Relation Age of Onset    High Blood Pressure Sister     Diabetes Sister     Coronary Art Dis Sister     Diabetes Brother

## 2023-08-16 NOTE — ED NOTES
Lab called and spoke to Prince Brandt gonzalez about hemolyzed ;abs. This nurse called lab back and redrew and reordered cbc, troponin and cmp.      Rima Shah RN  08/16/23 2003

## 2023-08-17 ENCOUNTER — TELEPHONE (OUTPATIENT)
Dept: FAMILY MEDICINE CLINIC | Age: 88
End: 2023-08-17

## 2023-08-17 LAB
C DIFF GDH + TOXINS A+B STL QL IA.RAPID: ABNORMAL
C DIFFICILE TOXINS, PCR: NEGATIVE
DATE, STOOL #1: NORMAL
EKG ATRIAL RATE: 65 BPM
EKG P AXIS: 71 DEGREES
EKG P-R INTERVAL: 220 MS
EKG Q-T INTERVAL: 384 MS
EKG QRS DURATION: 70 MS
EKG QTC CALCULATION (BAZETT): 399 MS
EKG R AXIS: -24 DEGREES
EKG T AXIS: 74 DEGREES
EKG VENTRICULAR RATE: 65 BPM
HEMOCCULT SP1 STL QL: NEGATIVE
ROTAVIRUS ANTIGEN: NEGATIVE
SOURCE, 60200063: NORMAL
SPECIMEN DESCRIPTION: ABNORMAL
SPECIMEN DESCRIPTION: NORMAL
TIME, STOOL #1: NORMAL

## 2023-08-17 PROCEDURE — 93010 ELECTROCARDIOGRAM REPORT: CPT | Performed by: INTERNAL MEDICINE

## 2023-08-17 NOTE — TELEPHONE ENCOUNTER
Returned call to patient to advise of Gastro appointment scheduled for Monday, 8/21/23 @ 11 AM. Called several times but reached  every time. Left a message providing the date of appointment.

## 2023-08-18 LAB
FAT QUALITATIVE SPLIT STOOL: NORMAL
FECAL NEUTRAL FAT: NORMAL
G LAMBLIA AG STL QL IA: NEGATIVE
MICROORGANISM SPEC CULT: ABNORMAL
MICROORGANISM SPEC CULT: NORMAL
MICROORGANISM SPEC CULT: NORMAL
SOURCE: NORMAL
SPECIMEN DESCRIPTION: ABNORMAL
SPECIMEN DESCRIPTION: NORMAL
SPECIMEN DESCRIPTION: NORMAL

## 2023-09-20 ENCOUNTER — CARE COORDINATION (OUTPATIENT)
Dept: CARE COORDINATION | Age: 88
End: 2023-09-20

## 2023-09-20 RX ORDER — MULTIVIT WITH MINERALS/LUTEIN
1000 TABLET ORAL DAILY
COMMUNITY

## 2023-09-20 SDOH — ECONOMIC STABILITY: TRANSPORTATION INSECURITY
IN THE PAST 12 MONTHS, HAS THE LACK OF TRANSPORTATION KEPT YOU FROM MEDICAL APPOINTMENTS OR FROM GETTING MEDICATIONS?: NO

## 2023-09-20 SDOH — ECONOMIC STABILITY: TRANSPORTATION INSECURITY
IN THE PAST 12 MONTHS, HAS LACK OF TRANSPORTATION KEPT YOU FROM MEETINGS, WORK, OR FROM GETTING THINGS NEEDED FOR DAILY LIVING?: NO

## 2023-09-20 ASSESSMENT — SOCIAL DETERMINANTS OF HEALTH (SDOH)
IN A TYPICAL WEEK, HOW MANY TIMES DO YOU TALK ON THE PHONE WITH FAMILY, FRIENDS, OR NEIGHBORS?: MORE THAN THREE TIMES A WEEK
HOW OFTEN DO YOU ATTEND CHURCH OR RELIGIOUS SERVICES?: NEVER
DO YOU BELONG TO ANY CLUBS OR ORGANIZATIONS SUCH AS CHURCH GROUPS UNIONS, FRATERNAL OR ATHLETIC GROUPS, OR SCHOOL GROUPS?: NO
HOW OFTEN DO YOU ATTENT MEETINGS OF THE CLUB OR ORGANIZATION YOU BELONG TO?: NEVER
HOW OFTEN DO YOU GET TOGETHER WITH FRIENDS OR RELATIVES?: NEVER

## 2023-09-20 NOTE — CARE COORDINATION
Ambulatory Care Coordination Note  2023    Patient Current Location:  Home: [address]  [address] [facility]    ACM contacted the patient by telephone. Verified name and  with patient as identifiers. Provided introduction to self, and explanation of the ACM role. ACM: Manjit Montoya RN    Challenges to be reviewed by the provider   Additional needs identified to be addressed with provider: Yes  Review plan of care               Method of communication with provider: chart routing. ACM contacted Tony mSyth to invite her to join care coordination and she is agreeable. She reports she lives alone and is independent with her ADL's. She states she still drives. She reports she is active with nephrology Dr Jennifer Ambrocio and follows up with him yearly. She reports she completed her gastro appointment for blood in her stools and she reports no change in her medications and no testing performed. She denies any blood in her stool currently. Medications were reviewed and she reports she is not taking [place]. She reports she does take Align probiotic and Vitamin E over the counter. She reports she takes her remaining medications as prescribed. ACM reviewed SDOH and Tony Smyth expressed interest in home delivered meals. ACM discussed Advanced Care Planning and Tony Emanueljulio cesar states she recently completed documentation. ACM verified documents are in the EMR. Future appointments were reviewed. PLAN  Send list of home delivered meals available. Review medications, appointments and assess care coordination needs with next interaction. Continue to follow for care coordination. Offered patient enrollment in the Remote Patient Monitoring (RPM) program for in-home monitoring: Patient declined.     Ambulatory Care Coordination Assessment    Care Coordination Protocol  Week 1 - Initial Assessment     Do you have all of your prescriptions and are they filled?: Yes  Barriers to medication adherence: None  Are you

## 2023-10-12 ENCOUNTER — CARE COORDINATION (OUTPATIENT)
Dept: CARE COORDINATION | Age: 88
End: 2023-10-12

## 2023-10-12 NOTE — CARE COORDINATION
Ambulatory Care Coordination Note  10/12/2023    Patient Current Location:  Home: [address]  [address] [facility]     ACM contacted the patient by telephone. Verified name and  with patient as identifiers. Provided introduction to self, and explanation of the ACM role. Challenges to be reviewed by the provider   Additional needs identified to be addressed with provider: No  none               Method of communication with provider: none. ACM: Ilan Ricks RN      ACM contacted Dolores Claire for care coordination follow up. She reports she has not checked her blood pressure. She denies any shortness of breath, headache or chest pain. She reports \"just a little\" swelling in her ankles. She denies any changes in her medications and states she is taking them as prescribed. ACM reviewed the list of home delivered meals and Dolores Claire states she has not made a decision. Future appointments were reviewed. PLAN  Review medications, appointments and assess care coordination needs with next interaction. Continue to follow for care coordination. Offered patient enrollment in the Remote Patient Monitoring (RPM) program for in-home monitoring: Patient declined. Lab Results       None                 Goals Addressed                   This Visit's Progress     Conditions and Symptoms   On track     I will schedule office visits, as directed by my provider. I will keep my appointment or reschedule if I have to cancel. I will notify my provider of any barriers to my plan of care. I will notify my provider of any symptoms that indicate a worsening of my condition.     Barriers: stress and lack of education  Plan for overcoming my barriers: care coordination  Confidence: 8/10  Anticipated Goal Completion Date: 23                Future Appointments   Date Time Provider [address]   2023  2:30 PM Dl Cabrera MD [facility]   2024  1:30 PM Dl Cabrera MD

## 2023-11-02 ENCOUNTER — CARE COORDINATION (OUTPATIENT)
Dept: CARE COORDINATION | Age: 88
End: 2023-11-02

## 2023-11-02 NOTE — CARE COORDINATION
coordination  Confidence: 8/10  Anticipated Goal Completion Date: 12/20/23                Future Appointments   Date Time Provider [address]   12/13/2023  2:30 PM Myra Thompson MD [facility]   7/18/2024  1:30 PM Myra Thompson MD [facility]   ,   Diabetes Assessment    Medic Alert ID: No  How often do you test your blood sugar?: No Testing   Do you have barriers with adherence to non-pharmacologic self-management interventions?  (Nutrition/Exercise/Self-Monitoring): No   Have you ever had to go to the ED for symptoms of low blood sugar?: No            ,   Hypertension - Encounter Level    Symptom course: no change     , and   General Assessment    Do you have any symptoms that are causing concern?: No

## 2023-12-06 ENCOUNTER — CARE COORDINATION (OUTPATIENT)
Dept: CARE COORDINATION | Age: 88
End: 2023-12-06

## 2023-12-06 NOTE — CARE COORDINATION
Attempted to reach patient by telephone. Unable to leave voicemail. Will send unable to reach letter.

## 2023-12-13 ENCOUNTER — OFFICE VISIT (OUTPATIENT)
Age: 88
End: 2023-12-13
Payer: MEDICARE

## 2023-12-13 VITALS
SYSTOLIC BLOOD PRESSURE: 136 MMHG | TEMPERATURE: 97 F | OXYGEN SATURATION: 96 % | HEART RATE: 69 BPM | WEIGHT: 151.4 LBS | HEIGHT: 67 IN | RESPIRATION RATE: 16 BRPM | BODY MASS INDEX: 23.76 KG/M2 | DIASTOLIC BLOOD PRESSURE: 80 MMHG

## 2023-12-13 DIAGNOSIS — E11.29 TYPE 2 DIABETES MELLITUS WITH OTHER KIDNEY COMPLICATION, UNSPECIFIED WHETHER LONG TERM INSULIN USE (HCC): Primary | ICD-10-CM

## 2023-12-13 DIAGNOSIS — I1A.0 RESISTANT HYPERTENSION: ICD-10-CM

## 2023-12-13 DIAGNOSIS — N18.31 STAGE 3A CHRONIC KIDNEY DISEASE (HCC): ICD-10-CM

## 2023-12-13 LAB — HBA1C MFR BLD: 6.4 %

## 2023-12-13 PROCEDURE — 3044F HG A1C LEVEL LT 7.0%: CPT | Performed by: FAMILY MEDICINE

## 2023-12-13 PROCEDURE — 99214 OFFICE O/P EST MOD 30 MIN: CPT | Performed by: FAMILY MEDICINE

## 2023-12-13 PROCEDURE — 1123F ACP DISCUSS/DSCN MKR DOCD: CPT | Performed by: FAMILY MEDICINE

## 2023-12-13 PROCEDURE — 83036 HEMOGLOBIN GLYCOSYLATED A1C: CPT | Performed by: FAMILY MEDICINE

## 2023-12-13 ASSESSMENT — ENCOUNTER SYMPTOMS
VOMITING: 0
CONSTIPATION: 0
COUGH: 0
ABDOMINAL PAIN: 0
NAUSEA: 0
SHORTNESS OF BREATH: 0
DIARRHEA: 0
WHEEZING: 0

## 2023-12-13 NOTE — PROGRESS NOTES
with above treatment plan. Advised patient to call with any new medication issues, and read all Rx info from pharmacy to assure aware of all possible risks and side effects of medication before taking. Reviewed age and gender appropriate health screening exams and vaccinations. Advised patient regarding importance of keeping up with recommended health maintenance and to schedule as soon as possible if overdue, as this is important in assessing for undiagnosed pathology, especially cancer, as well as protecting against potentially harmful/life threatening disease. Patient and/or guardian verbalizes understanding and agrees with above counseling, assessment and plan. All questions answered    Additional plan and future considerations:   RTO in 6 months. Return to Office: Return in about 6 months (around 6/13/2024) for HTN, Diabetes.     Electronically signed by Celena Mitchell MD on 12/13/2023 at 3:13 PM

## 2024-06-13 PROBLEM — H11.30 SUBCONJUNCTIVAL HEMORRHAGE: Status: ACTIVE | Noted: 2024-06-13

## 2024-06-13 PROBLEM — R19.8 IRREGULAR BOWEL HABITS: Status: ACTIVE | Noted: 2024-06-13

## 2024-06-18 ENCOUNTER — OFFICE VISIT (OUTPATIENT)
Age: 89
End: 2024-06-18
Payer: MEDICARE

## 2024-06-18 VITALS
HEIGHT: 67 IN | RESPIRATION RATE: 12 BRPM | TEMPERATURE: 97.3 F | HEART RATE: 62 BPM | OXYGEN SATURATION: 98 % | WEIGHT: 151.6 LBS | DIASTOLIC BLOOD PRESSURE: 78 MMHG | SYSTOLIC BLOOD PRESSURE: 118 MMHG | BODY MASS INDEX: 23.79 KG/M2

## 2024-06-18 DIAGNOSIS — N18.31 STAGE 3A CHRONIC KIDNEY DISEASE (HCC): ICD-10-CM

## 2024-06-18 DIAGNOSIS — I1A.0 RESISTANT HYPERTENSION: ICD-10-CM

## 2024-06-18 DIAGNOSIS — Z00.00 MEDICARE ANNUAL WELLNESS VISIT, SUBSEQUENT: ICD-10-CM

## 2024-06-18 DIAGNOSIS — E03.9 HYPOTHYROIDISM, UNSPECIFIED TYPE: ICD-10-CM

## 2024-06-18 DIAGNOSIS — Z78.0 ASYMPTOMATIC MENOPAUSAL STATE: ICD-10-CM

## 2024-06-18 DIAGNOSIS — E11.29 TYPE 2 DIABETES MELLITUS WITH OTHER KIDNEY COMPLICATION, UNSPECIFIED WHETHER LONG TERM INSULIN USE (HCC): Primary | ICD-10-CM

## 2024-06-18 DIAGNOSIS — E55.9 VITAMIN D DEFICIENCY: ICD-10-CM

## 2024-06-18 LAB
BASOPHILS ABSOLUTE: 0.03 K/UL (ref 0–0.2)
BASOPHILS RELATIVE PERCENT: 1 % (ref 0–2)
CHOLESTEROL, TOTAL: 225 MG/DL
EOSINOPHILS ABSOLUTE: 0.07 K/UL (ref 0.05–0.5)
EOSINOPHILS RELATIVE PERCENT: 1 % (ref 0–6)
HBA1C MFR BLD: 6.1 %
HCT VFR BLD CALC: 42.3 % (ref 34–48)
HDLC SERPL-MCNC: 101 MG/DL
HEMOGLOBIN: 13.8 G/DL (ref 11.5–15.5)
IMMATURE GRANULOCYTES %: 0 % (ref 0–5)
IMMATURE GRANULOCYTES ABSOLUTE: <0.03 K/UL (ref 0–0.58)
LDL CHOLESTEROL: 105 MG/DL
LYMPHOCYTES ABSOLUTE: 2.86 K/UL (ref 1.5–4)
LYMPHOCYTES RELATIVE PERCENT: 47 % (ref 20–42)
MCH RBC QN AUTO: 33.6 PG (ref 26–35)
MCHC RBC AUTO-ENTMCNC: 32.6 G/DL (ref 32–34.5)
MCV RBC AUTO: 102.9 FL (ref 80–99.9)
MONOCYTES ABSOLUTE: 0.72 K/UL (ref 0.1–0.95)
MONOCYTES RELATIVE PERCENT: 12 % (ref 2–12)
NEUTROPHILS ABSOLUTE: 2.4 K/UL (ref 1.8–7.3)
NEUTROPHILS RELATIVE PERCENT: 39 % (ref 43–80)
PDW BLD-RTO: 13.2 % (ref 11.5–15)
PLATELET # BLD: 275 K/UL (ref 130–450)
PMV BLD AUTO: 9.3 FL (ref 7–12)
RBC # BLD: 4.11 M/UL (ref 3.5–5.5)
TRIGL SERPL-MCNC: 94 MG/DL
TSH SERPL DL<=0.05 MIU/L-ACNC: 4.75 UIU/ML (ref 0.27–4.2)
VITAMIN D 25-HYDROXY: 51.9 NG/ML (ref 30–100)
VLDLC SERPL CALC-MCNC: 19 MG/DL
WBC # BLD: 6.1 K/UL (ref 4.5–11.5)

## 2024-06-18 PROCEDURE — 36415 COLL VENOUS BLD VENIPUNCTURE: CPT | Performed by: FAMILY MEDICINE

## 2024-06-18 PROCEDURE — G0439 PPPS, SUBSEQ VISIT: HCPCS | Performed by: FAMILY MEDICINE

## 2024-06-18 PROCEDURE — G0468 FQHC VISIT, IPPE OR AWV: HCPCS | Performed by: FAMILY MEDICINE

## 2024-06-18 PROCEDURE — 3044F HG A1C LEVEL LT 7.0%: CPT | Performed by: FAMILY MEDICINE

## 2024-06-18 PROCEDURE — 1123F ACP DISCUSS/DSCN MKR DOCD: CPT | Performed by: FAMILY MEDICINE

## 2024-06-18 PROCEDURE — 99214 OFFICE O/P EST MOD 30 MIN: CPT | Performed by: FAMILY MEDICINE

## 2024-06-18 PROCEDURE — 83036 HEMOGLOBIN GLYCOSYLATED A1C: CPT | Performed by: FAMILY MEDICINE

## 2024-06-18 SDOH — ECONOMIC STABILITY: FOOD INSECURITY: WITHIN THE PAST 12 MONTHS, THE FOOD YOU BOUGHT JUST DIDN'T LAST AND YOU DIDN'T HAVE MONEY TO GET MORE.: NEVER TRUE

## 2024-06-18 SDOH — ECONOMIC STABILITY: FOOD INSECURITY: WITHIN THE PAST 12 MONTHS, YOU WORRIED THAT YOUR FOOD WOULD RUN OUT BEFORE YOU GOT MONEY TO BUY MORE.: NEVER TRUE

## 2024-06-18 SDOH — ECONOMIC STABILITY: INCOME INSECURITY: HOW HARD IS IT FOR YOU TO PAY FOR THE VERY BASICS LIKE FOOD, HOUSING, MEDICAL CARE, AND HEATING?: NOT HARD AT ALL

## 2024-06-18 ASSESSMENT — ENCOUNTER SYMPTOMS
BLOOD IN STOOL: 0
COUGH: 0
NAUSEA: 0
SHORTNESS OF BREATH: 0
EYE DISCHARGE: 0
EYE PAIN: 0
ABDOMINAL PAIN: 0
VOMITING: 0
DIARRHEA: 0
CONSTIPATION: 0

## 2024-06-18 ASSESSMENT — PATIENT HEALTH QUESTIONNAIRE - PHQ9
SUM OF ALL RESPONSES TO PHQ QUESTIONS 1-9: 0
SUM OF ALL RESPONSES TO PHQ QUESTIONS 1-9: 2
SUM OF ALL RESPONSES TO PHQ QUESTIONS 1-9: 2
SUM OF ALL RESPONSES TO PHQ QUESTIONS 1-9: 0
SUM OF ALL RESPONSES TO PHQ9 QUESTIONS 1 & 2: 0
1. LITTLE INTEREST OR PLEASURE IN DOING THINGS: SEVERAL DAYS
2. FEELING DOWN, DEPRESSED OR HOPELESS: NOT AT ALL
SUM OF ALL RESPONSES TO PHQ QUESTIONS 1-9: 2
1. LITTLE INTEREST OR PLEASURE IN DOING THINGS: NOT AT ALL
SUM OF ALL RESPONSES TO PHQ9 QUESTIONS 1 & 2: 2
SUM OF ALL RESPONSES TO PHQ QUESTIONS 1-9: 2
2. FEELING DOWN, DEPRESSED OR HOPELESS: SEVERAL DAYS

## 2024-06-18 ASSESSMENT — LIFESTYLE VARIABLES
HOW OFTEN DO YOU HAVE A DRINK CONTAINING ALCOHOL: MONTHLY OR LESS
HOW MANY STANDARD DRINKS CONTAINING ALCOHOL DO YOU HAVE ON A TYPICAL DAY: 1 OR 2

## 2024-06-18 NOTE — PROGRESS NOTES
[facility]  DATE OF VISIT : 2024    Patient : Yeni Schmitt   Age : 88 y.o.    : 1935   MRN : [id number]   ______________________________________________________________________    Chief Complaint :   Chief Complaint   Patient presents with    6 Month Follow-Up     AWV/Patient is here today for her 6 month follow up.   A1C today is 6.1       HPI : Yeni Schmitt is 88 y.o. female who presented to the clinic today for AWV/OV.     HTN: Nifedipine 30 mg twice daily, Coreg 25 mg daily.      Stage 3 CKD: Seen by nephro today. Doing well, Cr is improved.     NIDDM: Last A1c (2023) 6.4.  Patient currently not on any medications.      I reviewed the patient's past medications, allergies and past medical history during this visit.    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     Past Surgical History:   Procedure Laterality Date    CATARACT REMOVAL WITH IMPLANT Bilateral     CHOLECYSTECTOMY      COLONOSCOPY      HYSTERECTOMY (CERVIX STATUS UNKNOWN)         Social History :  Social History       Tobacco History       Smoking Status  Former Smoking Tobacco Type  Cigarettes      Smokeless Tobacco Use  Never      Tobacco Comments  quit more than 30 years ago smoking              Alcohol History       Alcohol Use Status  Yes Comment  occasionally              Drug Use       Drug Use Status  No              Sexual Activity       Sexually Active  Not Currently                     Allergies :   Allergies   Allergen Reactions    Barbiturates Rash and Other (See Comments)     On limbs  On limbs      Hydralazine Rash    Benzodiazepines     Ethanol     Valsartan Other (See Comments)    Phenobarbital Rash    Valium [Diazepam] Rash       Medication List :    Current Outpatient Medications   Medication Sig Dispense Refill    vitamin E 450 MG 
CAPS capsule Take 1 capsule by mouth daily Yes Nuha Sargent MD   Probiotic Product (ALIGN PO) Take 1 tablet by mouth daily Yes Nuha Sargent MD   carvedilol (COREG) 25 MG tablet TAKE ONE TABLET BY MOUTH TWICE A DAY WITH MEALS Yes Nuha Sargent MD   tobramycin-dexamethasone (TOBRADEX) 0.3-0.1 % ophthalmic suspension INSTILL ONE DROP INTO THE RIGHT EYE FOUR TIMES A DAY Yes Nuha Sargent MD   fluticasone (FLONASE) 50 MCG/ACT nasal spray USE AS NEEDED Yes Nuha Sargent MD   NIFEdipine (ADALAT CC) 30 MG extended release tablet Take 1 tablet by mouth 2 times daily Yes Elijah Subramanian MD   chlorthalidone (HYGROTON) 25 MG tablet Take 0.5 tablets by mouth daily Yes Elijah Subramanian MD   polyethylene glycol (MIRALAX) powder Take 17 g daily for the next 10 days. Decreased to one half capful once daily once having soft stools. Yes Elijah Subramanian MD       CareTeam (Including outside providers/suppliers regularly involved in providing care):   Patient Care Team:  Yuli Sewell MD as PCP - General (Family Medicine)  Yuli Sewell MD as PCP - Empaneled Provider     Reviewed and updated this visit:  Tobacco  Allergies  Meds  Problems  Med Hx  Surg Hx  Soc Hx  Fam Hx

## 2024-06-19 ENCOUNTER — TELEPHONE (OUTPATIENT)
Age: 89
End: 2024-06-19

## 2024-06-19 NOTE — TELEPHONE ENCOUNTER
Called patient and spoke to her and gave her number of [phone] for Direction Home for her to find resources to got to senior citizen centers and have fun. Patient had reported that she was at home bored so I called her back with resources to be able to have fun and get out the house.

## 2025-03-13 ENCOUNTER — HOSPITAL ENCOUNTER (EMERGENCY)
Age: 89
Discharge: HOME | End: 2025-03-13
Attending: EMERGENCY MEDICINE
Payer: MEDICARE

## 2025-03-13 VITALS
OXYGEN SATURATION: 99 % | DIASTOLIC BLOOD PRESSURE: 82 MMHG | RESPIRATION RATE: 18 BRPM | SYSTOLIC BLOOD PRESSURE: 172 MMHG | HEART RATE: 65 BPM | TEMPERATURE: 98 F | WEIGHT: 164 LBS | HEIGHT: 67 IN | BODY MASS INDEX: 25.74 KG/M2

## 2025-03-13 DIAGNOSIS — I10 ASYMPTOMATIC HYPERTENSION: Primary | ICD-10-CM

## 2025-03-13 PROCEDURE — 99282 EMERGENCY DEPT VISIT SF MDM: CPT

## 2025-03-13 ASSESSMENT — PAIN - FUNCTIONAL ASSESSMENT
PAIN_FUNCTIONAL_ASSESSMENT: NONE - DENIES PAIN
PAIN_FUNCTIONAL_ASSESSMENT: NONE - DENIES PAIN

## 2025-03-14 NOTE — DISCHARGE INSTRUCTIONS
Please return to ED if symptoms worsen, persist, or occur.  Please follow-up with PCP and Dr. Bruner .  Please take your medications as prescribed.    No orders to display

## 2025-03-14 NOTE — ED PROVIDER NOTES
Department of Emergency Medicine     Written by: Caridad Iraheta MD  Patient Name: Yeni Schmitt  Admit Date: 3/13/2025  9:27 PM  MRN: [id number]                   : 1935    HPI     Chief Complaint   Patient presents with    Hypertension     It's been high but tonight stayed high       Yeni Schmitt is a 89 y.o. female that presents to the ED due to concerns for hypertension.  She is asymptomatic no chest pain no shortness of breath no abdominal pain blurry vision nausea vomiting headache dizziness.  She states she saw her blood pressure today 200/107 knee, and then the machine would also not work.  She says she has had orthostatic lightheadedness intermittently over the past 6 months and is being followed by her PCP for this.  Her blood pressure earlier today was 148/80 and she says her medications usually keep her blood pressure well-controlled    Review of systems   Pertinent positives and negatives mentioned in the HPI/MDM.      Physical   Physical Exam  Constitutional:       General: She is not in acute distress.     Appearance: Normal appearance.   Eyes:      Extraocular Movements: Extraocular movements intact.      Pupils: Pupils are equal, round, and reactive to light.   Cardiovascular:      Rate and Rhythm: Normal rate and regular rhythm.   Pulmonary:      Effort: Pulmonary effort is normal. No respiratory distress.   Abdominal:      Palpations: Abdomen is soft.      Tenderness: There is no abdominal tenderness.   Musculoskeletal:         General: No swelling or tenderness. Normal range of motion.   Skin:     General: Skin is warm and dry.      Coloration: Skin is not jaundiced or pale.   Neurological:      Mental Status: She is alert and oriented to person, place, and time. Mental status is at baseline.      Cranial Nerves: No cranial nerve deficit.      Sensory: No sensory deficit.      Motor: No weakness.      Coordination: Coordination normal.      Gait: Gait normal.          Chart

## 2025-03-17 ENCOUNTER — INITIAL CONSULT (OUTPATIENT)
Dept: GERIATRIC MEDICINE | Age: 89
End: 2025-03-17
Payer: MEDICARE

## 2025-03-17 VITALS
OXYGEN SATURATION: 98 % | WEIGHT: 151.2 LBS | SYSTOLIC BLOOD PRESSURE: 106 MMHG | HEART RATE: 73 BPM | DIASTOLIC BLOOD PRESSURE: 70 MMHG | TEMPERATURE: 97.6 F | BODY MASS INDEX: 23.68 KG/M2

## 2025-03-17 DIAGNOSIS — R41.0 CONFUSION: Primary | ICD-10-CM

## 2025-03-17 DIAGNOSIS — R41.0 CONFUSION: ICD-10-CM

## 2025-03-17 DIAGNOSIS — E11.29 TYPE 2 DIABETES MELLITUS WITH OTHER KIDNEY COMPLICATION, UNSPECIFIED WHETHER LONG TERM INSULIN USE (HCC): ICD-10-CM

## 2025-03-17 DIAGNOSIS — N18.31 STAGE 3A CHRONIC KIDNEY DISEASE (HCC): ICD-10-CM

## 2025-03-17 PROCEDURE — 1160F RVW MEDS BY RX/DR IN RCRD: CPT | Performed by: INTERNAL MEDICINE

## 2025-03-17 PROCEDURE — 99213 OFFICE O/P EST LOW 20 MIN: CPT | Performed by: INTERNAL MEDICINE

## 2025-03-17 PROCEDURE — 1123F ACP DISCUSS/DSCN MKR DOCD: CPT | Performed by: INTERNAL MEDICINE

## 2025-03-17 PROCEDURE — 1159F MED LIST DOCD IN RCRD: CPT | Performed by: INTERNAL MEDICINE

## 2025-03-17 SDOH — ECONOMIC STABILITY: FOOD INSECURITY: WITHIN THE PAST 12 MONTHS, YOU WORRIED THAT YOUR FOOD WOULD RUN OUT BEFORE YOU GOT MONEY TO BUY MORE.: NEVER TRUE

## 2025-03-17 SDOH — ECONOMIC STABILITY: FOOD INSECURITY: WITHIN THE PAST 12 MONTHS, THE FOOD YOU BOUGHT JUST DIDN'T LAST AND YOU DIDN'T HAVE MONEY TO GET MORE.: NEVER TRUE

## 2025-03-17 ASSESSMENT — PATIENT HEALTH QUESTIONNAIRE - PHQ9
SUM OF ALL RESPONSES TO PHQ QUESTIONS 1-9: 0
2. FEELING DOWN, DEPRESSED OR HOPELESS: NOT AT ALL
SUM OF ALL RESPONSES TO PHQ QUESTIONS 1-9: 0
1. LITTLE INTEREST OR PLEASURE IN DOING THINGS: NOT AT ALL
SUM OF ALL RESPONSES TO PHQ QUESTIONS 1-9: 0
SUM OF ALL RESPONSES TO PHQ QUESTIONS 1-9: 0
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
## 2025-03-18 LAB
FOLATE: >20 NG/ML (ref 4.8–24.2)
VITAMIN B-12: 1965 PG/ML (ref 211–946)

## 2025-03-26 ENCOUNTER — TELEPHONE (OUTPATIENT)
Dept: GERIATRIC MEDICINE | Age: 89
End: 2025-03-26

## 2025-03-28 RX ORDER — MEMANTINE HYDROCHLORIDE 5 MG/1
5 TABLET ORAL DAILY
Qty: 30 TABLET | Refills: 5 | Status: SHIPPED | OUTPATIENT
Start: 2025-03-28

## 2025-03-28 NOTE — TELEPHONE ENCOUNTER
Patient contacted letting her know to not start vitamin B 12 but that Dr Bruner did call her in Namenda. Patient stated understanding and prescription for Namenda was called into [facility] on [facility].

## 2025-06-04 ENCOUNTER — CARE COORDINATION (OUTPATIENT)
Dept: CARE COORDINATION | Age: 89
End: 2025-06-04

## 2025-06-04 NOTE — CARE COORDINATION
Ambulatory Care Coordination Note     6/4/2025 3:19 PM     ACM outreach attempt by this ACM today to offer care management services. ACM was unable to reach the patient by telephone today;   Cognitive issues     ACM: Brianna Romano RN     Care Summary Note: ACM spoke with caregiver and patient on speaker phone. Patient PCP is Alysia Giordano with the [facility]. The declined care coordination.    PCP/Specialist follow up:   Future Appointments         Provider Specialty Dept Phone    9/23/2025 12:45 PM David Bruner MD Geriatric Medicine [phone]            Follow Up:  No further further Ambulatory Care Coordination follow up scheduled at this time. Patient has care manager's contact information for any further questions or concerns.